# Patient Record
Sex: MALE | Race: WHITE | NOT HISPANIC OR LATINO | Employment: UNEMPLOYED | URBAN - NONMETROPOLITAN AREA
[De-identification: names, ages, dates, MRNs, and addresses within clinical notes are randomized per-mention and may not be internally consistent; named-entity substitution may affect disease eponyms.]

---

## 2017-11-20 ENCOUNTER — HOSPITAL ENCOUNTER (EMERGENCY)
Facility: HOSPITAL | Age: 52
Discharge: HOME | End: 2017-11-21
Attending: EMERGENCY MEDICINE | Admitting: EMERGENCY MEDICINE

## 2017-11-20 DIAGNOSIS — F10.10 ALCOHOL ABUSE: Primary | ICD-10-CM

## 2017-11-20 LAB
6-ACETYL MORPHINE: NEGATIVE
ALBUMIN SERPL-MCNC: 4.4 G/DL (ref 3.5–5)
ALBUMIN/GLOB SERPL: 1.5 G/DL (ref 1.5–2.5)
ALP SERPL-CCNC: 104 U/L (ref 40–129)
ALT SERPL W P-5'-P-CCNC: 28 U/L (ref 10–44)
AMPHET+METHAMPHET UR QL: NEGATIVE
ANION GAP SERPL CALCULATED.3IONS-SCNC: 7.5 MMOL/L (ref 3.6–11.2)
AST SERPL-CCNC: 30 U/L (ref 10–34)
BARBITURATES UR QL SCN: NEGATIVE
BASOPHILS # BLD AUTO: 0.04 10*3/MM3 (ref 0–0.3)
BASOPHILS NFR BLD AUTO: 0.4 % (ref 0–2)
BENZODIAZ UR QL SCN: NEGATIVE
BILIRUB SERPL-MCNC: 0.4 MG/DL (ref 0.2–1.8)
BILIRUB UR QL STRIP: NEGATIVE
BUN BLD-MCNC: 5 MG/DL (ref 7–21)
BUN/CREAT SERPL: 6.9 (ref 7–25)
BUPRENORPHINE SERPL-MCNC: NEGATIVE NG/ML
CALCIUM SPEC-SCNC: 9.4 MG/DL (ref 7.7–10)
CANNABINOIDS SERPL QL: NEGATIVE
CHLORIDE SERPL-SCNC: 109 MMOL/L (ref 99–112)
CLARITY UR: CLEAR
CO2 SERPL-SCNC: 26.5 MMOL/L (ref 24.3–31.9)
COCAINE UR QL: NEGATIVE
COLOR UR: YELLOW
CREAT BLD-MCNC: 0.72 MG/DL (ref 0.43–1.29)
DEPRECATED RDW RBC AUTO: 41.9 FL (ref 37–54)
EOSINOPHIL # BLD AUTO: 0.38 10*3/MM3 (ref 0–0.7)
EOSINOPHIL NFR BLD AUTO: 4.3 % (ref 0–5)
ERYTHROCYTE [DISTWIDTH] IN BLOOD BY AUTOMATED COUNT: 11.2 % (ref 11.5–14.5)
ETHANOL BLD-MCNC: 157 MG/DL
ETHANOL BLD-MCNC: 306 MG/DL
ETHANOL UR QL: 0.16 %
ETHANOL UR QL: 0.31 %
GFR SERPL CREATININE-BSD FRML MDRD: 115 ML/MIN/1.73
GLOBULIN UR ELPH-MCNC: 3 GM/DL
GLUCOSE BLD-MCNC: 72 MG/DL (ref 70–110)
GLUCOSE UR STRIP-MCNC: NEGATIVE MG/DL
HCT VFR BLD AUTO: 47.8 % (ref 42–52)
HGB BLD-MCNC: 17 G/DL (ref 14–18)
HGB UR QL STRIP.AUTO: NEGATIVE
IMM GRANULOCYTES # BLD: 0.02 10*3/MM3 (ref 0–0.03)
IMM GRANULOCYTES NFR BLD: 0.2 % (ref 0–0.5)
KETONES UR QL STRIP: NEGATIVE
LEUKOCYTE ESTERASE UR QL STRIP.AUTO: NEGATIVE
LYMPHOCYTES # BLD AUTO: 2.68 10*3/MM3 (ref 1–3)
LYMPHOCYTES NFR BLD AUTO: 30.1 % (ref 21–51)
MAGNESIUM SERPL-MCNC: 2.2 MG/DL (ref 1.7–2.6)
MCH RBC QN AUTO: 37 PG (ref 27–33)
MCHC RBC AUTO-ENTMCNC: 35.6 G/DL (ref 33–37)
MCV RBC AUTO: 103.9 FL (ref 80–94)
METHADONE UR QL SCN: NEGATIVE
MONOCYTES # BLD AUTO: 0.76 10*3/MM3 (ref 0.1–0.9)
MONOCYTES NFR BLD AUTO: 8.5 % (ref 0–10)
NEUTROPHILS # BLD AUTO: 5.01 10*3/MM3 (ref 1.4–6.5)
NEUTROPHILS NFR BLD AUTO: 56.5 % (ref 30–70)
NITRITE UR QL STRIP: NEGATIVE
OPIATES UR QL: NEGATIVE
OSMOLALITY SERPL CALC.SUM OF ELEC: 280.8 MOSM/KG (ref 273–305)
OXYCODONE UR QL SCN: NEGATIVE
PCP UR QL SCN: NEGATIVE
PH UR STRIP.AUTO: 6.5 [PH] (ref 5–8)
PLATELET # BLD AUTO: 264 10*3/MM3 (ref 130–400)
PMV BLD AUTO: 8.4 FL (ref 6–10)
POTASSIUM BLD-SCNC: 3.9 MMOL/L (ref 3.5–5.3)
PROT SERPL-MCNC: 7.4 G/DL (ref 6–8)
PROT UR QL STRIP: NEGATIVE
RBC # BLD AUTO: 4.6 10*6/MM3 (ref 4.7–6.1)
SODIUM BLD-SCNC: 143 MMOL/L (ref 135–153)
SP GR UR STRIP: <=1.005 (ref 1–1.03)
UROBILINOGEN UR QL STRIP: NORMAL
WBC NRBC COR # BLD: 8.89 10*3/MM3 (ref 4.5–12.5)

## 2017-11-20 PROCEDURE — 85025 COMPLETE CBC W/AUTO DIFF WBC: CPT | Performed by: PHYSICIAN ASSISTANT

## 2017-11-20 PROCEDURE — 80307 DRUG TEST PRSMV CHEM ANLYZR: CPT | Performed by: PHYSICIAN ASSISTANT

## 2017-11-20 PROCEDURE — 99285 EMERGENCY DEPT VISIT HI MDM: CPT

## 2017-11-20 PROCEDURE — 25010000002 THIAMINE PER 100 MG: Performed by: PHYSICIAN ASSISTANT

## 2017-11-20 PROCEDURE — 83735 ASSAY OF MAGNESIUM: CPT | Performed by: PHYSICIAN ASSISTANT

## 2017-11-20 PROCEDURE — 96365 THER/PROPH/DIAG IV INF INIT: CPT

## 2017-11-20 PROCEDURE — 80053 COMPREHEN METABOLIC PANEL: CPT | Performed by: PHYSICIAN ASSISTANT

## 2017-11-20 PROCEDURE — 36415 COLL VENOUS BLD VENIPUNCTURE: CPT

## 2017-11-20 PROCEDURE — 25010000002 MAGNESIUM SULFATE PER 500 MG OF MAGNESIUM: Performed by: PHYSICIAN ASSISTANT

## 2017-11-20 PROCEDURE — 81003 URINALYSIS AUTO W/O SCOPE: CPT | Performed by: PHYSICIAN ASSISTANT

## 2017-11-20 RX ORDER — NICOTINE 21 MG/24HR
1 PATCH, TRANSDERMAL 24 HOURS TRANSDERMAL ONCE
Status: DISCONTINUED | OUTPATIENT
Start: 2017-11-20 | End: 2017-11-21 | Stop reason: HOSPADM

## 2017-11-20 RX ORDER — SODIUM CHLORIDE 0.9 % (FLUSH) 0.9 %
10 SYRINGE (ML) INJECTION AS NEEDED
Status: DISCONTINUED | OUTPATIENT
Start: 2017-11-20 | End: 2017-11-21 | Stop reason: HOSPADM

## 2017-11-20 RX ADMIN — THIAMINE HYDROCHLORIDE 1000 ML/HR: 100 INJECTION, SOLUTION INTRAMUSCULAR; INTRAVENOUS at 18:20

## 2017-11-20 RX ADMIN — NICOTINE 1 PATCH: 21 PATCH TRANSDERMAL at 20:35

## 2017-11-21 VITALS
WEIGHT: 145 LBS | RESPIRATION RATE: 16 BRPM | BODY MASS INDEX: 24.16 KG/M2 | SYSTOLIC BLOOD PRESSURE: 138 MMHG | HEIGHT: 65 IN | OXYGEN SATURATION: 99 % | DIASTOLIC BLOOD PRESSURE: 88 MMHG | HEART RATE: 84 BPM | TEMPERATURE: 98 F

## 2017-11-21 LAB
ETHANOL BLD-MCNC: 106 MG/DL
ETHANOL UR QL: 0.11 %
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
## 2017-11-21 NOTE — NURSING NOTE
Informed by Ed pt had been medically cleared and would be coming to intake. ETOH level currently 157, was just redrawn prior to room change.

## 2017-11-21 NOTE — NURSING NOTE
Reviewed pt with Dr. Stack. Pt does not meet admission criteria at this time. ED provider and pt made aware. Per order of Dr. Stack, instructed pt to come back when withdrawal symptoms worsen. IOP packed also provided if pt decides to pursue outpatient treatment.

## 2017-11-21 NOTE — ED PROVIDER NOTES
Subjective   HPI Comments: 52-year-old male presents to the ED today for a mental health evaluation.  He states that he lost his job last week and it has exacerbated his symptoms.  He states he was diagnosed with bipolar disorder many years ago and it caused him to be discharged from [employer].  He states he did not like the way medications made him feel so he has been self-medicating with alcohol.  He states he is drinking 4-5 24 ounce beers a day.  He is last drank just prior to arrival.  [facility] and [facility] came and [facility] him up.  He denies any drug use.  He states he is having suicidal thoughts with a plan to drink himself to death.  He denies any homicidal ideations.  He states he does have past suicide attempts.  He states he feels very anxious and paranoid.  He denies any hallucinations.  He states he has had a decreased appetite and has been sleeping more than normal.  He states he is currently homeless but has been staying with a friend.    Patient is a 52 y.o. male presenting with mental health disorder.   History provided by:  Patient  Mental Health Problem   Presenting symptoms: depression and suicidal thoughts    Degree of incapacity (severity):  Moderate  Onset quality:  Gradual  Duration:  1 week  Timing:  Constant  Progression:  Worsening  Chronicity:  New  Context: alcohol use and stressful life event    Relieved by:  Nothing  Worsened by:  Alcohol  Associated symptoms: anhedonia, anxiety, appetite change, feelings of worthlessness, irritability and poor judgment    Associated symptoms: no insomnia    Risk factors: hx of mental illness and hx of suicide attempts        Review of Systems   Constitutional: Positive for appetite change and irritability.   HENT: Negative.    Eyes: Negative.    Respiratory: Negative.    Cardiovascular: Negative.    Gastrointestinal: Negative.    Genitourinary: Negative.    Musculoskeletal: Negative.    Skin: Negative.     Neurological: Negative.    Psychiatric/Behavioral: Positive for dysphoric mood and suicidal ideas. The patient is nervous/anxious. The patient does not have insomnia.    All other systems reviewed and are negative.      Past Medical History:   Diagnosis Date   • Alcohol abuse    • Anxiety    • Bipolar disorder    • Depression    • GERD (gastroesophageal reflux disease)    • Psychiatric illness    • Suicide attempt        Allergies   Allergen Reactions   • Nsaids GI Bleeding       Past Surgical History:   Procedure Laterality Date   • APPENDECTOMY     • KNEE SURGERY     • ORIF ELBOW FRACTURE      Right elbow   • TONSILLECTOMY         Family History   Problem Relation Age of Onset   • Family history unknown: Yes       Social History     Social History   • Marital status: Single     Spouse name: N/A   • Number of children: N/A   • Years of education: N/A     Social History Main Topics   • Smoking status: Current Every Day Smoker     Packs/day: 2.50     Years: 30.00     Types: Cigarettes   • Smokeless tobacco: Never Used   • Alcohol use 30.0 oz/week     50 Cans of beer per week      Comment: Last used 11/20/17   • Drug use: No   • Sexual activity: Defer     Other Topics Concern   • None     Social History Narrative           Objective   Physical Exam   Constitutional: He is oriented to person, place, and time. He appears well-developed and well-nourished. No distress.   HENT:   Head: Normocephalic and atraumatic.   Right Ear: External ear normal.   Left Ear: External ear normal.   Nose: Nose normal.   Mouth/Throat: Oropharynx is clear and moist.   Eyes: Conjunctivae and EOM are normal. Pupils are equal, round, and reactive to light.   Neck: Normal range of motion. Neck supple.   Cardiovascular: Normal rate, regular rhythm, normal heart sounds and intact distal pulses.    Pulmonary/Chest: Effort normal and breath sounds normal. No respiratory distress.   Abdominal: Soft. Bowel sounds are normal. There is no tenderness.    Musculoskeletal: Normal range of motion.   Neurological: He is alert and oriented to person, place, and time.   Skin: Skin is warm and dry.   Psychiatric: His speech is normal and behavior is normal. Judgment normal. His mood appears anxious. Cognition and memory are normal. He exhibits a depressed mood. He expresses suicidal ideation. He expresses no homicidal ideation. He expresses suicidal plans.   Nursing note and vitals reviewed.      Procedures         ED Course  ED Course   Comment By Time   Patient is eating, no distress CHAVO Clement 11/20 1858   Endorsed to CHAVO Rooney 11/20 1958   Patient adamantly denies SI/HI,AVH. CIWA-6. Dr. Stack states that patient does not meet criteria for inpatient treatment.  She recommends IOP. CHAVO Quiñones 11/21 0119                  [facility]    Final diagnoses:   Alcohol abuse            CHAVO Quiñones  11/21/17 0120

## 2017-11-28 ENCOUNTER — HOSPITAL ENCOUNTER (EMERGENCY)
Facility: HOSPITAL | Age: 52
Discharge: PSYCHIATRIC OR SUBSTANCE USE FACILITY | End: 2017-11-28
Attending: EMERGENCY MEDICINE | Admitting: EMERGENCY MEDICINE

## 2017-11-28 ENCOUNTER — HOSPITAL ENCOUNTER (INPATIENT)
Facility: HOSPITAL | Age: 52
LOS: 6 days | Discharge: HOME | End: 2017-12-04
Attending: PSYCHIATRY & NEUROLOGY | Admitting: PSYCHIATRY & NEUROLOGY

## 2017-11-28 VITALS
HEIGHT: 65 IN | WEIGHT: 140 LBS | RESPIRATION RATE: 18 BRPM | OXYGEN SATURATION: 100 % | BODY MASS INDEX: 23.32 KG/M2 | HEART RATE: 108 BPM | TEMPERATURE: 98.2 F | SYSTOLIC BLOOD PRESSURE: 148 MMHG | DIASTOLIC BLOOD PRESSURE: 86 MMHG

## 2017-11-28 DIAGNOSIS — F10.10 ALCOHOL ABUSE: ICD-10-CM

## 2017-11-28 DIAGNOSIS — R45.851 DEPRESSION WITH SUICIDAL IDEATION: Primary | ICD-10-CM

## 2017-11-28 DIAGNOSIS — F32.A DEPRESSION WITH SUICIDAL IDEATION: Primary | ICD-10-CM

## 2017-11-28 PROBLEM — F32.9 MDD (MAJOR DEPRESSIVE DISORDER): Status: ACTIVE | Noted: 2017-11-28

## 2017-11-28 LAB
6-ACETYL MORPHINE: NEGATIVE
ALBUMIN SERPL-MCNC: 4.1 G/DL (ref 3.5–5)
ALBUMIN/GLOB SERPL: 1.4 G/DL (ref 1.5–2.5)
ALP SERPL-CCNC: 102 U/L (ref 40–129)
ALT SERPL W P-5'-P-CCNC: 22 U/L (ref 10–44)
AMPHET+METHAMPHET UR QL: NEGATIVE
ANION GAP SERPL CALCULATED.3IONS-SCNC: 6.9 MMOL/L (ref 3.6–11.2)
AST SERPL-CCNC: 23 U/L (ref 10–34)
BARBITURATES UR QL SCN: NEGATIVE
BASOPHILS # BLD AUTO: 0.04 10*3/MM3 (ref 0–0.3)
BASOPHILS NFR BLD AUTO: 0.6 % (ref 0–2)
BENZODIAZ UR QL SCN: NEGATIVE
BILIRUB SERPL-MCNC: 0.2 MG/DL (ref 0.2–1.8)
BILIRUB UR QL STRIP: ABNORMAL
BUN BLD-MCNC: <5 MG/DL (ref 7–21)
BUN/CREAT SERPL: ABNORMAL (ref 7–25)
BUPRENORPHINE SERPL-MCNC: NEGATIVE NG/ML
CALCIUM SPEC-SCNC: 9.1 MG/DL (ref 7.7–10)
CANNABINOIDS SERPL QL: NEGATIVE
CHLORIDE SERPL-SCNC: 111 MMOL/L (ref 99–112)
CLARITY UR: CLEAR
CO2 SERPL-SCNC: 22.1 MMOL/L (ref 24.3–31.9)
COCAINE UR QL: NEGATIVE
COLOR UR: ABNORMAL
CREAT BLD-MCNC: 0.62 MG/DL (ref 0.43–1.29)
DEPRECATED RDW RBC AUTO: 44.4 FL (ref 37–54)
EOSINOPHIL # BLD AUTO: 0.32 10*3/MM3 (ref 0–0.7)
EOSINOPHIL NFR BLD AUTO: 5.1 % (ref 0–5)
ERYTHROCYTE [DISTWIDTH] IN BLOOD BY AUTOMATED COUNT: 11.6 % (ref 11.5–14.5)
ETHANOL BLD-MCNC: 24 MG/DL
ETHANOL UR QL: 0.02 %
GFR SERPL CREATININE-BSD FRML MDRD: 137 ML/MIN/1.73
GLOBULIN UR ELPH-MCNC: 2.9 GM/DL
GLUCOSE BLD-MCNC: 88 MG/DL (ref 70–110)
GLUCOSE UR STRIP-MCNC: NEGATIVE MG/DL
HCT VFR BLD AUTO: 45.5 % (ref 42–52)
HGB BLD-MCNC: 15.6 G/DL (ref 14–18)
HGB UR QL STRIP.AUTO: NEGATIVE
IMM GRANULOCYTES # BLD: 0.01 10*3/MM3 (ref 0–0.03)
IMM GRANULOCYTES NFR BLD: 0.2 % (ref 0–0.5)
KETONES UR QL STRIP: ABNORMAL
LEUKOCYTE ESTERASE UR QL STRIP.AUTO: NEGATIVE
LYMPHOCYTES # BLD AUTO: 2 10*3/MM3 (ref 1–3)
LYMPHOCYTES NFR BLD AUTO: 31.6 % (ref 21–51)
MAGNESIUM SERPL-MCNC: 2.1 MG/DL (ref 1.7–2.6)
MCH RBC QN AUTO: 35.9 PG (ref 27–33)
MCHC RBC AUTO-ENTMCNC: 34.3 G/DL (ref 33–37)
MCV RBC AUTO: 104.8 FL (ref 80–94)
METHADONE UR QL SCN: NEGATIVE
MONOCYTES # BLD AUTO: 0.6 10*3/MM3 (ref 0.1–0.9)
MONOCYTES NFR BLD AUTO: 9.5 % (ref 0–10)
NEUTROPHILS # BLD AUTO: 3.36 10*3/MM3 (ref 1.4–6.5)
NEUTROPHILS NFR BLD AUTO: 53 % (ref 30–70)
NITRITE UR QL STRIP: NEGATIVE
OPIATES UR QL: NEGATIVE
OSMOLALITY SERPL CALC.SUM OF ELEC: NORMAL MOSM/KG (ref 273–305)
OXYCODONE UR QL SCN: NEGATIVE
PCP UR QL SCN: NEGATIVE
PH UR STRIP.AUTO: <=5 [PH] (ref 5–8)
PLATELET # BLD AUTO: 238 10*3/MM3 (ref 130–400)
PMV BLD AUTO: 9.4 FL (ref 6–10)
POTASSIUM BLD-SCNC: 3.9 MMOL/L (ref 3.5–5.3)
PROT SERPL-MCNC: 7 G/DL (ref 6–8)
PROT UR QL STRIP: NEGATIVE
RBC # BLD AUTO: 4.34 10*6/MM3 (ref 4.7–6.1)
SODIUM BLD-SCNC: 140 MMOL/L (ref 135–153)
SP GR UR STRIP: 1.02 (ref 1–1.03)
UROBILINOGEN UR QL STRIP: ABNORMAL
WBC NRBC COR # BLD: 6.33 10*3/MM3 (ref 4.5–12.5)

## 2017-11-28 PROCEDURE — 93010 ELECTROCARDIOGRAM REPORT: CPT | Performed by: INTERNAL MEDICINE

## 2017-11-28 PROCEDURE — HZ2ZZZZ DETOXIFICATION SERVICES FOR SUBSTANCE ABUSE TREATMENT: ICD-10-PCS | Performed by: PSYCHIATRY & NEUROLOGY

## 2017-11-28 PROCEDURE — 93005 ELECTROCARDIOGRAM TRACING: CPT | Performed by: PSYCHIATRY & NEUROLOGY

## 2017-11-28 RX ORDER — LORAZEPAM 1 MG/1
1 TABLET ORAL
Status: COMPLETED | OUTPATIENT
Start: 2017-12-01 | End: 2017-12-01

## 2017-11-28 RX ORDER — ALUMINA, MAGNESIA, AND SIMETHICONE 2400; 2400; 240 MG/30ML; MG/30ML; MG/30ML
15 SUSPENSION ORAL EVERY 6 HOURS PRN
Status: DISCONTINUED | OUTPATIENT
Start: 2017-11-28 | End: 2017-12-04 | Stop reason: HOSPADM

## 2017-11-28 RX ORDER — LORAZEPAM 1 MG/1
1 TABLET ORAL EVERY 4 HOURS PRN
Status: ACTIVE | OUTPATIENT
Start: 2017-12-01 | End: 2017-12-01

## 2017-11-28 RX ORDER — LORAZEPAM 0.5 MG/1
0.5 TABLET ORAL
Status: COMPLETED | OUTPATIENT
Start: 2017-12-02 | End: 2017-12-02

## 2017-11-28 RX ORDER — ONDANSETRON 4 MG/1
4 TABLET, FILM COATED ORAL EVERY 6 HOURS PRN
Status: DISCONTINUED | OUTPATIENT
Start: 2017-11-28 | End: 2017-12-04 | Stop reason: HOSPADM

## 2017-11-28 RX ORDER — IBUPROFEN 600 MG/1
600 TABLET ORAL EVERY 6 HOURS PRN
Status: DISCONTINUED | OUTPATIENT
Start: 2017-11-28 | End: 2017-12-03

## 2017-11-28 RX ORDER — TRAZODONE HYDROCHLORIDE 50 MG/1
50 TABLET ORAL NIGHTLY PRN
Status: DISCONTINUED | OUTPATIENT
Start: 2017-11-28 | End: 2017-12-04 | Stop reason: HOSPADM

## 2017-11-28 RX ORDER — LORAZEPAM 2 MG/1
2 TABLET ORAL EVERY 4 HOURS PRN
Status: ACTIVE | OUTPATIENT
Start: 2017-11-29 | End: 2017-11-29

## 2017-11-28 RX ORDER — LORAZEPAM 2 MG/1
2 TABLET ORAL
Status: COMPLETED | OUTPATIENT
Start: 2017-11-29 | End: 2017-11-29

## 2017-11-28 RX ORDER — NICOTINE 21 MG/24HR
1 PATCH, TRANSDERMAL 24 HOURS TRANSDERMAL
Status: DISCONTINUED | OUTPATIENT
Start: 2017-11-28 | End: 2017-12-04 | Stop reason: HOSPADM

## 2017-11-28 RX ORDER — ECHINACEA PURPUREA EXTRACT 125 MG
2 TABLET ORAL AS NEEDED
Status: DISCONTINUED | OUTPATIENT
Start: 2017-11-28 | End: 2017-12-04 | Stop reason: HOSPADM

## 2017-11-28 RX ORDER — MULTIVITAMIN
1 TABLET ORAL DAILY
Status: DISCONTINUED | OUTPATIENT
Start: 2017-11-28 | End: 2017-12-04 | Stop reason: HOSPADM

## 2017-11-28 RX ORDER — BENZTROPINE MESYLATE 1 MG/ML
0.5 INJECTION INTRAMUSCULAR; INTRAVENOUS DAILY PRN
Status: DISCONTINUED | OUTPATIENT
Start: 2017-11-28 | End: 2017-12-04 | Stop reason: HOSPADM

## 2017-11-28 RX ORDER — THIAMINE HCL 50 MG
100 TABLET ORAL DAILY
Status: DISCONTINUED | OUTPATIENT
Start: 2017-11-28 | End: 2017-12-04 | Stop reason: HOSPADM

## 2017-11-28 RX ORDER — LOPERAMIDE HYDROCHLORIDE 2 MG/1
2 CAPSULE ORAL 4 TIMES DAILY PRN
Status: DISCONTINUED | OUTPATIENT
Start: 2017-11-28 | End: 2017-12-04 | Stop reason: HOSPADM

## 2017-11-28 RX ORDER — HYDROXYZINE 50 MG/1
50 TABLET, FILM COATED ORAL EVERY 6 HOURS PRN
Status: DISCONTINUED | OUTPATIENT
Start: 2017-11-28 | End: 2017-12-04 | Stop reason: HOSPADM

## 2017-11-28 RX ORDER — LORAZEPAM 2 MG/1
2 TABLET ORAL EVERY 4 HOURS PRN
Status: DISPENSED | OUTPATIENT
Start: 2017-11-28 | End: 2017-11-29

## 2017-11-28 RX ORDER — BENZONATATE 100 MG/1
100 CAPSULE ORAL 3 TIMES DAILY PRN
Status: DISCONTINUED | OUTPATIENT
Start: 2017-11-28 | End: 2017-12-04 | Stop reason: HOSPADM

## 2017-11-28 RX ORDER — BENZTROPINE MESYLATE 1 MG/1
1 TABLET ORAL DAILY PRN
Status: DISCONTINUED | OUTPATIENT
Start: 2017-11-28 | End: 2017-12-04 | Stop reason: HOSPADM

## 2017-11-28 RX ORDER — LORAZEPAM 0.5 MG/1
0.5 TABLET ORAL EVERY 4 HOURS PRN
Status: ACTIVE | OUTPATIENT
Start: 2017-12-02 | End: 2017-12-02

## 2017-11-28 RX ORDER — FAMOTIDINE 20 MG/1
20 TABLET, FILM COATED ORAL 2 TIMES DAILY PRN
Status: DISCONTINUED | OUTPATIENT
Start: 2017-11-28 | End: 2017-12-04 | Stop reason: HOSPADM

## 2017-11-28 RX ADMIN — ONDANSETRON 4 MG: 4 TABLET, FILM COATED ORAL at 21:25

## 2017-11-28 RX ADMIN — Medication 1 TABLET: at 17:25

## 2017-11-28 RX ADMIN — LORAZEPAM 2 MG: 2 TABLET ORAL at 21:25

## 2017-11-28 RX ADMIN — NICOTINE 1 PATCH: 21 PATCH TRANSDERMAL at 15:38

## 2017-11-28 RX ADMIN — THIAMINE HCL (VITAMIN B1) 50 MG TABLET 100 MG: at 17:25

## 2017-11-28 RX ADMIN — IBUPROFEN 600 MG: 600 TABLET ORAL at 21:25

## 2017-11-28 RX ADMIN — TRAZODONE HYDROCHLORIDE 50 MG: 50 TABLET ORAL at 21:25

## 2017-11-29 RX ADMIN — IBUPROFEN 600 MG: 600 TABLET ORAL at 08:47

## 2017-11-29 RX ADMIN — LORAZEPAM 2 MG: 2 TABLET ORAL at 21:29

## 2017-11-29 RX ADMIN — HYDROXYZINE HYDROCHLORIDE 50 MG: 50 TABLET ORAL at 00:25

## 2017-11-29 RX ADMIN — LORAZEPAM 2 MG: 2 TABLET ORAL at 15:40

## 2017-11-29 RX ADMIN — ONDANSETRON 4 MG: 4 TABLET, FILM COATED ORAL at 08:47

## 2017-11-29 RX ADMIN — IBUPROFEN 600 MG: 600 TABLET ORAL at 15:39

## 2017-11-29 RX ADMIN — LORAZEPAM 2 MG: 2 TABLET ORAL at 08:47

## 2017-11-29 RX ADMIN — Medication 1 TABLET: at 08:47

## 2017-11-29 RX ADMIN — HYDROXYZINE HYDROCHLORIDE 50 MG: 50 TABLET ORAL at 08:47

## 2017-11-29 RX ADMIN — THIAMINE HCL (VITAMIN B1) 50 MG TABLET 100 MG: at 08:47

## 2017-11-29 RX ADMIN — NICOTINE 1 PATCH: 21 PATCH TRANSDERMAL at 08:47

## 2017-11-29 RX ADMIN — SERTRALINE 50 MG: 50 TABLET, FILM COATED ORAL at 15:40

## 2017-11-30 RX ADMIN — TRAZODONE HYDROCHLORIDE 50 MG: 50 TABLET ORAL at 21:12

## 2017-11-30 RX ADMIN — LORAZEPAM 1.5 MG: 1 TABLET ORAL at 21:12

## 2017-11-30 RX ADMIN — NICOTINE 1 PATCH: 21 PATCH TRANSDERMAL at 08:29

## 2017-11-30 RX ADMIN — LORAZEPAM 1.5 MG: 1 TABLET ORAL at 14:24

## 2017-11-30 RX ADMIN — LORAZEPAM 1.5 MG: 1 TABLET ORAL at 08:29

## 2017-11-30 RX ADMIN — SERTRALINE 50 MG: 50 TABLET, FILM COATED ORAL at 08:29

## 2017-11-30 RX ADMIN — Medication 1 TABLET: at 08:29

## 2017-11-30 RX ADMIN — THIAMINE HCL (VITAMIN B1) 50 MG TABLET 100 MG: at 08:29

## 2017-12-01 RX ADMIN — LORAZEPAM 1 MG: 1 TABLET ORAL at 14:54

## 2017-12-01 RX ADMIN — THIAMINE HCL (VITAMIN B1) 50 MG TABLET 100 MG: at 08:24

## 2017-12-01 RX ADMIN — SERTRALINE 50 MG: 50 TABLET, FILM COATED ORAL at 08:24

## 2017-12-01 RX ADMIN — Medication 1 TABLET: at 08:24

## 2017-12-01 RX ADMIN — TRAZODONE HYDROCHLORIDE 50 MG: 50 TABLET ORAL at 21:14

## 2017-12-01 RX ADMIN — LORAZEPAM 1 MG: 1 TABLET ORAL at 21:14

## 2017-12-01 RX ADMIN — NICOTINE 1 PATCH: 21 PATCH TRANSDERMAL at 08:25

## 2017-12-01 RX ADMIN — LORAZEPAM 1 MG: 1 TABLET ORAL at 08:25

## 2017-12-01 NOTE — PLAN OF CARE
"Problem:  Patient Care Overview (Adult)  Goal: Plan of Care Review  Outcome: Ongoing (interventions implemented as appropriate)    12/01/17 1126   Coping/Psychosocial Response Interventions   Plan Of Care Reviewed With patient   Coping/Psychosocial   Patient Agreement with Plan of Care agrees   Patient Care Overview   Consent Given to Review Plan with Friend-Yunior Almazan    Progress improving   Outcome Evaluation   Outcome Summary/Follow up Plan Therapist met with Patient to discuss current treatment and discharge concerns. Patient agreeable.       [id number]  Data:  Therapist met with Patient individually today. Patient agreeable to discuss current treatment progress and discharge concerns. Therapist inquired about Patient's disposition plan. Patient reported that he is uncertain. Therapist advocated the need for residential services. Patient agreeable. Therapist discussed multiple residential programs possible for Patient. Patient discussed [facility] as desired residential program. Patient signed consent. Patient reported that he \"likes that [facility] has a hosltic approach\". Patient reported that if he is not approved [facility] than he plans to return to his friends home at discharge and comply with outpatient services. Patient consented for [facility], and [facility] this date.      Therapist contacted Patient's friend Angel Norah. Angel agreeable for safety planning. Angel reported that his home is a safe environment for Patient to return home to.      Assessment:  Patient appeared more focused today. Patient continues to appear hesitant and uncertain about his aftercare. Patient does not voice motivation to address addiction but more so his \"body health\". Patient denies any depression, anxiety, or SI/HI today. Patient denies any withdrawal symptoms or cravings.      Plan:  Treatment Team has completed referral to [facility] on Patient's behalf. Treatment Team awaiting to verify if " Patient's insurance is approved for services.      If Patient is not approved for [facility] Patient will return home with friend as disposition and begin outpatient services at [facility].     Therapist will also [facility].
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
## 2017-12-01 NOTE — PROGRESS NOTES
LOS: 3 days   Patient Care Team:  No Known Provider as PCP - General    Chief Complaint:  Patient says he is anxious to find out where he is going to go from here, he would like to go to a rehabilitation program, he knows that his therapist is trying to help him, and he expects to find out about this later in the day.      Interval History: He denies depression, rates anxiety at 2, he has slept 8 hours, denies craving for alcohol and denies withdrawal symptoms.  He also denies suicidal thoughts homicidal thoughts hallucinations or paranoia.  He is well oriented, his appetite is good.            Vital Signs    Temp:  [97.2 °F (36.2 °C)-98.2 °F (36.8 °C)] 97.5 °F (36.4 °C)  Heart Rate:  [] 109  Resp:  [16-18] 16  BP: (133-140)/(86-97) 133/97    Lab Results:   Lab Results (last 24 hours)     ** No results found for the last 24 hours. **           Labs:     Lab Results (last 24 hours)     ** No results found for the last 24 hours. **                        Exam:  Completed:  completed within view of staff  Mental Status Exam:     Hygiene:   good  Cooperation:  Cooperative  Eye Contact:  Good  Psychomotor Behavior:  Appropriate  Affect:  Appropriate  Speech:  Normal  Thought Progress:  Goal directed  Thought Content:  Mood congurent  Suicidal:  None  Homicidal:  None  Hallucinations:  None  Delusion:  None  Memory:  Intact  Orientation:  Person, Place, Time and Situation  Reliability:  fair  Insight:  Fair  Judgement:  Fair  Impulse Control:  Fair      Results Review:    Lab Results (last 24 hours)     ** No results found for the last 24 hours. **          Medication Review:  Hospital Medications (active)       Dose Frequency Start End    aluminum-magnesium hydroxide-simethicone (MAALOX MAX) 400-400-40 MG/5ML suspension 15 mL 15 mL Every 6 Hours PRN 11/28/2017     Sig - Route: Take 15 mL by mouth Every 6 (Six) Hours As Needed for Indigestion or Heartburn. - Oral    benzonatate (TESSALON) capsule 100 mg 100 mg 3  "Times Daily PRN 11/28/2017     Sig - Route: Take 1 capsule by mouth 3 (Three) Times a Day As Needed for Cough. - Oral    benztropine (COGENTIN) injection 0.5 mg 0.5 mg Daily PRN 11/28/2017     Sig - Route: Inject 0.5 mL into the shoulder, thigh, or buttocks Daily As Needed (tremors). - Intramuscular    Linked Group 1:  \"Or\" Linked Group Details        benztropine (COGENTIN) tablet 1 mg 1 mg Daily PRN 11/28/2017     Sig - Route: Take 1 tablet by mouth Daily As Needed for Tremors. - Oral    Linked Group 1:  \"Or\" Linked Group Details        famotidine (PEPCID) tablet 20 mg 20 mg 2 Times Daily PRN 11/28/2017     Sig - Route: Take 1 tablet by mouth 2 (Two) Times a Day As Needed for Heartburn. - Oral    hydrOXYzine (ATARAX) tablet 50 mg 50 mg Every 6 Hours PRN 11/28/2017     Sig - Route: Take 1 tablet by mouth Every 6 (Six) Hours As Needed for Anxiety. - Oral    ibuprofen (ADVIL,MOTRIN) tablet 600 mg 600 mg Every 6 Hours PRN 11/28/2017     Sig - Route: Take 1 tablet by mouth Every 6 (Six) Hours As Needed for Mild Pain  or Moderate Pain  (severe pain (7-10)). - Oral    loperamide (IMODIUM) capsule 2 mg 2 mg 4 Times Daily PRN 11/28/2017     Sig - Route: Take 1 capsule by mouth 4 (Four) Times a Day As Needed for Diarrhea. - Oral    LORazepam (ATIVAN) tablet 0.5 mg 0.5 mg User Specified 12/2/2017 12/3/2017    Sig - Route: Take 1 tablet by mouth 3 times per day. - Oral    Linked Group 2:  \"Followed by\" Linked Group Details        LORazepam (ATIVAN) tablet 0.5 mg 0.5 mg Every 4 Hours PRN 12/2/2017 12/2/2017    Sig - Route: Take 1 tablet by mouth Every 4 (Four) Hours As Needed for Withdrawal (PAS Score 4 or Greater; 4 Hours After Scheduled Lorazepam Dose). - Oral    Linked Group 3:  \"Followed by\" Linked Group Details        LORazepam (ATIVAN) tablet 1 mg 1 mg User Specified 12/1/2017 12/2/2017    Sig - Route: Take 1 tablet by mouth 3 times per day. - Oral    Linked Group 2:  \"Followed by\" Linked Group Details        LORazepam " "(ATIVAN) tablet 1 mg 1 mg Every 4 Hours PRN 12/1/2017 12/1/2017    Sig - Route: Take 1 tablet by mouth Every 4 (Four) Hours As Needed for Withdrawal (PAS Score 4 or Greater; 4 Hours After Scheduled Lorazepam Dose). - Oral    Linked Group 3:  \"Followed by\" Linked Group Details        LORazepam (ATIVAN) tablet 1.5 mg 1.5 mg User Specified 11/30/2017 11/30/2017    Sig - Route: Take 1.5 mg by mouth 3 times per day. - Oral    Linked Group 2:  \"Followed by\" Linked Group Details        LORazepam (ATIVAN) tablet 1.5 mg 1.5 mg Every 4 Hours PRN 11/30/2017 11/30/2017    Sig - Route: Take 1.5 mg by mouth Every 4 (Four) Hours As Needed for Withdrawal (PAS Score 4 or Greater; 4 Hours After Scheduled Lorazepam Dose). - Oral    Linked Group 3:  \"Followed by\" Linked Group Details        magnesium hydroxide (MILK OF MAGNESIA) suspension 2400 mg/10mL 10 mL 10 mL Daily PRN 11/28/2017     Sig - Route: Take 10 mL by mouth Daily As Needed for Constipation. - Oral    multivitamin (DAILY ANGELITO) tablet 1 tablet 1 tablet Daily 11/28/2017     Sig - Route: Take 1 tablet by mouth Daily. - Oral    nicotine (NICODERM CQ) 21 MG/24HR patch 1 patch 1 patch Every 24 Hours Scheduled 11/28/2017     Sig - Route: Place 1 patch on the skin Daily. - Transdermal    ondansetron (ZOFRAN) tablet 4 mg 4 mg Every 6 Hours PRN 11/28/2017     Sig - Route: Take 1 tablet by mouth Every 6 (Six) Hours As Needed for Nausea or Vomiting. - Oral    sertraline (ZOLOFT) tablet 50 mg 50 mg Daily 11/29/2017     Sig - Route: Take 1 tablet by mouth Daily. - Oral    sodium chloride (OCEAN) nasal spray 2 spray 2 spray As Needed 11/28/2017     Sig - Route: 2 sprays by Each Nare route As Needed for Congestion. - Each Nare    traZODone (DESYREL) tablet 50 mg 50 mg Nightly PRN 11/28/2017     Sig - Route: Take 1 tablet by mouth At Night As Needed for Sleep. - Oral    vitamin B-1 tablet 100 mg 100 mg Daily 11/28/2017     Sig - Route: Take 2 tablets by mouth Daily. - Oral       "     Special Precautions Level: IV        Assessment/Plan     Assessment: Assessment: Assessment/Diagnosis: Major depression recurrent with psychosis  Alcohol dependence with alcohol withdrawal      Treatment Plan: No changes, discharge after detox is completed, patient prefers to be discharged to a rehabilitation facility.          I have reviewed and approved the behavioral health treatment plans and problem list. Yes        Maria Dolores Stack MD  12/01/17  12:01 PM

## 2017-12-01 NOTE — PROGRESS NOTES
Navigator is helping Primary Therapist with discharge planning for patient. Navigator completed referral to [facility] on this day. [facility] admissions will need to check patients insurance and see if it is the Veterans Insurance they accept.     Iam Sharkey [facility] - [phone]

## 2017-12-01 NOTE — DISCHARGE INSTR - APPOINTMENTS
[facility] [place]  [address] [place] [address]  [phone]    December 8 2017 at 1:30pm with Kiera.       Additional Resource for Residential Programs In [place]:    [facility]   [address] [place] [address] [phone]

## 2017-12-01 NOTE — PLAN OF CARE
Problem: BH Patient Care Overview (Adult)  Goal: Plan of Care Review  Outcome: Ongoing (interventions implemented as appropriate)  Rates anxiety 1 and depression 2-3. Denies suicidal thoughts. Denies hallucinations.  Goal: Interdisciplinary Rounds/Family Conference  Outcome: Ongoing (interventions implemented as appropriate)  Goal: Individualization and Mutuality  Outcome: Ongoing (interventions implemented as appropriate)  Goal: Discharge Needs Assessment  Outcome: Ongoing (interventions implemented as appropriate)    Problem:  Overarching Goals  Goal: Adheres to Safety Considerations for Self and Others  Outcome: Ongoing (interventions implemented as appropriate)  Goal: Optimized Coping Skills in Response to Life Stressors  Outcome: Ongoing (interventions implemented as appropriate)  Goal: Develops/Participates in Therapeutic [place] to Support Successful Transition  Outcome: Ongoing (interventions implemented as appropriate)    Problem: Depression  Goal: Refrain from harming self  Outcome: Ongoing (interventions implemented as appropriate)    Problem: SUBSTANCE USE/ABUSE  Goal: By day 5 will complete medical detox and be discharged with an appropriate treatment plan in place.  Outcome: Ongoing (interventions implemented as appropriate)

## 2017-12-01 NOTE — PROGRESS NOTES
Navigator is helping Primary Therapist with discharge planning for patient. Navigator received call from PAUL Campos at [facility]. Beth states that she has received a request for sooner bed for patient. Beth states they can not admit anyone on the weekends. Beth states Treatment Team and Patient will need to contact her Monday morning early, she arrives to work around 7:30am. Beth states patient will need to complete phone assessment and if accepted will need to arrive Monday before 9:30am, she can not admit anyone later than that time. Beth states she will not be able to admit patient on Tuesday next week but could possibly accept him on Wednesday. Treatment Team to call Beth early Monday to complete intake and schedule admit.     Beth - [phone]  Ext [phone]

## 2017-12-02 PROCEDURE — 99232 SBSQ HOSP IP/OBS MODERATE 35: CPT | Performed by: PSYCHIATRY & NEUROLOGY

## 2017-12-02 RX ADMIN — LORAZEPAM 0.5 MG: 0.5 TABLET ORAL at 21:17

## 2017-12-02 RX ADMIN — LORAZEPAM 0.5 MG: 0.5 TABLET ORAL at 08:44

## 2017-12-02 RX ADMIN — LORAZEPAM 0.5 MG: 0.5 TABLET ORAL at 14:51

## 2017-12-02 RX ADMIN — TRAZODONE HYDROCHLORIDE 50 MG: 50 TABLET ORAL at 21:17

## 2017-12-02 RX ADMIN — Medication 1 TABLET: at 08:44

## 2017-12-02 RX ADMIN — NICOTINE 1 PATCH: 21 PATCH TRANSDERMAL at 08:44

## 2017-12-02 RX ADMIN — THIAMINE HCL (VITAMIN B1) 50 MG TABLET 100 MG: at 08:44

## 2017-12-02 RX ADMIN — SERTRALINE 50 MG: 50 TABLET, FILM COATED ORAL at 08:44

## 2017-12-02 NOTE — PLAN OF CARE
Problem:  Patient Care Overview (Adult)  Goal: Plan of Care Review  Outcome: Ongoing (interventions implemented as appropriate)  Rated anxiety as 3 and depression as 2. Denies  cravings or withdrawal symptoms. Anticipates being discharged to a long term rehab facility today. Appeared to sleep well this shift.    12/02/17 0518   Coping/Psychosocial Response Interventions   Plan Of Care Reviewed With patient   Coping/Psychosocial   Patient Agreement with Plan of Care agrees   Patient Care Overview   Progress improving         Problem:  Overarching Goals  Goal: Adheres to Safety Considerations for Self and Others  Outcome: Ongoing (interventions implemented as appropriate)  Goal: Optimized Coping Skills in Response to Life Stressors  Outcome: Ongoing (interventions implemented as appropriate)  Goal: Develops/Participates in Therapeutic [place] to Support Successful Transition  Outcome: Ongoing (interventions implemented as appropriate)

## 2017-12-02 NOTE — PROGRESS NOTES
"      Inpatient Psy Progress Note   Clinician: Ricardo Noble MD  Admission Date: 11/28/2017  11:17 AM 12/02/17    Behavioral Health Treatment Plan and Problem List: I have reviewed and approved the Behavioral Health Treatment Plan and Problem list.    Allergies  Allergies   Allergen Reactions   • Nsaids GI Bleeding       Hospital Day: 4 days      Assessment completed within view of staff    History  CC: inpatient followup  Interval HPI: Patient seen and evaluated by me.  Chart reviewed.   His depression continues to improve.  Slept okay.  Denies withdrawal symptoms from alcohol.  No signs of psychosis.       Interval Review of Systems:   General ROS: negative for - fever or malaise  Endocrine ROS: negative for - palpitations  Respiratory ROS: no cough, shortness of breath, or wheezing  Cardiovascular ROS: no chest pain or dyspnea on exertion  Gastrointestinal ROS: no abdominal pain,no black or bloody stools    /59 (BP Location: Right arm, Patient Position: Sitting)  Pulse 77  Temp 97.1 °F (36.2 °C) (Temporal Artery )   Resp 16  Ht 65\" (165.1 cm)  Wt 136 lb 9.6 oz (62 kg)  SpO2 98%  BMI 22.73 kg/m2    Mental Status Exam  Mood: normal  Affect: normal  Thought Processes: linear, logical, and goal directed  Thought Content:  normal  Hallucinations: no  Suicidal Thoughts:  none  Suicidal Plan/Intent:none  Hopelesness: no  Homicidal Thoughts:  absent      Medical Decision Making:   Labs:     Lab Results (last 24 hours)     ** No results found for the last 24 hours. **            Radiology:     Imaging Results (last 24 hours)     ** No results found for the last 24 hours. **            EKG:     ECG/EMG Results (most recent)     Procedure Component Value Units Date/Time    ECG 12 Lead [[id number]] Collected:  11/28/17 1618     Updated:  11/29/17 1236    Narrative:       Test Reason : Potential adverse reaction to medications.  Blood Pressure : **/** mmHG  Vent. Rate : 069 BPM     Atrial Rate : 069 BPM     P-R " Int : 172 ms          QRS Dur : 084 ms      QT Int : 362 ms       P-R-T Axes : 075 081 067 degrees     QTc Int : 387 ms    Normal sinus rhythm with sinus arrhythmia  Normal ECG  No previous ECGs available  Confirmed by Savage Jha ([id number]) on 11/29/2017 12:36:49 PM    Referred By:  CONTRERAS           Confirmed By:Savage Jha           Medications:     LORazepam 0.5 mg Oral 3 times per day   multivitamin 1 tablet Oral Daily   nicotine 1 patch Transdermal Q24H   sertraline 50 mg Oral Daily   vitamin B-1 100 mg Oral Daily          All medications reviewed.      Assessment and Plan:    Active Problems:    MDD (major depressive disorder)    Alcohol Dependence    Chart reviewed.  It appears that navigator is working with primary therapist [facility].  Continue hospitalization through the weekend.

## 2017-12-02 NOTE — PLAN OF CARE
Problem: BH Patient Care Overview (Adult)  Goal: Plan of Care Review  Outcome: Ongoing (interventions implemented as appropriate)  Pt slept 8 hours he denies any anx /dep verbalizes meds are helping he verbalizes will be going to long term [facility] today     12/02/17 1807   Coping/Psychosocial Response Interventions   Plan Of Care Reviewed With patient   Coping/Psychosocial   Patient Agreement with Plan of Care agrees   Patient Care Overview   Progress improving         Problem: BH Overarching Goals  Goal: Adheres to Safety Considerations for Self and Others  Outcome: Ongoing (interventions implemented as appropriate)  Goal: Optimized Coping Skills in Response to Life Stressors  Outcome: Ongoing (interventions implemented as appropriate)  Goal: Develops/Participates in Therapeutic [place] to Support Successful Transition  Outcome: Ongoing (interventions implemented as appropriate)

## 2017-12-03 PROCEDURE — 99232 SBSQ HOSP IP/OBS MODERATE 35: CPT | Performed by: PSYCHIATRY & NEUROLOGY

## 2017-12-03 RX ORDER — IBUPROFEN 800 MG/1
800 TABLET ORAL EVERY 6 HOURS PRN
Status: DISCONTINUED | OUTPATIENT
Start: 2017-12-03 | End: 2017-12-04 | Stop reason: HOSPADM

## 2017-12-03 RX ADMIN — IBUPROFEN 800 MG: 800 TABLET ORAL at 13:19

## 2017-12-03 RX ADMIN — SERTRALINE 50 MG: 50 TABLET, FILM COATED ORAL at 08:51

## 2017-12-03 RX ADMIN — IBUPROFEN 600 MG: 600 TABLET ORAL at 04:35

## 2017-12-03 RX ADMIN — LIDOCAINE HYDROCHLORIDE 10 ML: 20 SOLUTION ORAL; TOPICAL at 23:15

## 2017-12-03 RX ADMIN — IBUPROFEN 800 MG: 800 TABLET ORAL at 23:15

## 2017-12-03 RX ADMIN — TRAZODONE HYDROCHLORIDE 50 MG: 50 TABLET ORAL at 20:58

## 2017-12-03 RX ADMIN — HYDROXYZINE HYDROCHLORIDE 50 MG: 50 TABLET ORAL at 20:58

## 2017-12-03 RX ADMIN — LIDOCAINE HYDROCHLORIDE 10 ML: 20 SOLUTION ORAL; TOPICAL at 13:19

## 2017-12-03 RX ADMIN — THIAMINE HCL (VITAMIN B1) 50 MG TABLET 100 MG: at 08:51

## 2017-12-03 RX ADMIN — NICOTINE 1 PATCH: 21 PATCH TRANSDERMAL at 08:51

## 2017-12-03 RX ADMIN — Medication 1 TABLET: at 08:51

## 2017-12-03 NOTE — PROGRESS NOTES
"      Inpatient Psy Progress Note   Clinician: Ricardo Noble MD  Admission Date: 11/28/2017  9:46 AM 12/03/17    Behavioral Health Treatment Plan and Problem List: I have reviewed and approved the Behavioral Health Treatment Plan and Problem list.    Allergies  Allergies   Allergen Reactions   • Nsaids GI Bleeding       Hospital Day: 5 days      Assessment completed within view of staff    History  CC: inpatient followup  Interval HPI: Patient seen and evaluated by me.  Chart reviewed.  He c/o tooth pain - caused him trouble sleeping last night.  Depression continues to improve.  Denies SI.  No psychosis.       Interval Review of Systems:   General ROS: negative for - fever or malaise  Endocrine ROS: negative for - palpitations  Respiratory ROS: no cough, shortness of breath, or wheezing  Cardiovascular ROS: no chest pain or dyspnea on exertion  Gastrointestinal ROS: no abdominal pain,no black or bloody stools    /79 (BP Location: Right arm, Patient Position: Sitting)  Pulse 93  Temp 97.2 °F (36.2 °C) (Temporal Artery )   Resp 18  Ht 65\" (165.1 cm)  Wt 136 lb 9.6 oz (62 kg)  SpO2 98%  BMI 22.73 kg/m2    Mental Status Exam  Mood: depressed  Affect: mood-congruent  Thought Processes: linear, logical, and goal directed  Thought Content:  Normal  Hallucinations: no  Suicidal Thoughts:  none  Suicidal Plan/Intent:none  Hopelesness: no  Homicidal Thoughts:  absent      Medical Decision Making:   Labs:     Lab Results (last 24 hours)     ** No results found for the last 24 hours. **            Radiology:     Imaging Results (last 24 hours)     ** No results found for the last 24 hours. **            EKG:     ECG/EMG Results (most recent)     Procedure Component Value Units Date/Time    ECG 12 Lead [[id number]] Collected:  11/28/17 1618     Updated:  11/29/17 1236    Narrative:       Test Reason : Potential adverse reaction to medications.  Blood Pressure : **/** mmHG  Vent. Rate : 069 BPM     Atrial Rate : 069 " BPM     P-R Int : 172 ms          QRS Dur : 084 ms      QT Int : 362 ms       P-R-T Axes : 075 081 067 degrees     QTc Int : 387 ms    Normal sinus rhythm with sinus arrhythmia  Normal ECG  No previous ECGs available  Confirmed by Savage Jha ([id number]) on 11/29/2017 12:36:49 PM    Referred By:  CONTRERAS           Confirmed By:Savage Jha           Medications:     multivitamin 1 tablet Oral Daily   nicotine 1 patch Transdermal Q24H   sertraline 50 mg Oral Daily   vitamin B-1 100 mg Oral Daily          All medications reviewed.      Assessment and Plan:    Active Problems:    MDD (major depressive disorder)       - Continue Zoloft. Continue hospitalization for safety and stabilization.  Continue current level of Special Precautions (q15 minute checks).        Alcohol Dependence      - Monitor for any additional signs of withdrawal.  [facility].      Tooth Pain      - Will provide viscous lidocaine and ibuprofen through the weekend.  He will need to followup with a Dentist after discharge.

## 2017-12-03 NOTE — PLAN OF CARE
Problem:  Patient Care Overview (Adult)  Goal: Plan of Care Review  Outcome: Ongoing (interventions implemented as appropriate)  Denied anxiety and rated depression as 3. Denies S.I. Says he felt down because he wanted to go home for a couple days before going to long-term rehab. Stays in his room a lot. Talks to his roommate during evening hours. Appeared to sleep up until around 0430 when he complained of toothache or sinus pain in his face. Says he couldn't really tell where pain was coming from. It was unrelieved by Motrin.     12/03/17 0547   Coping/Psychosocial Response Interventions   Plan Of Care Reviewed With patient   Coping/Psychosocial   Patient Agreement with Plan of Care agrees   Patient Care Overview   Progress improving         Problem:  Overarching Goals  Goal: Adheres to Safety Considerations for Self and Others  Outcome: Ongoing (interventions implemented as appropriate)  Goal: Optimized Coping Skills in Response to Life Stressors  Outcome: Ongoing (interventions implemented as appropriate)  Goal: Develops/Participates in Therapeutic [place] to Support Successful Transition  Outcome: Ongoing (interventions implemented as appropriate)

## 2017-12-03 NOTE — PLAN OF CARE
Problem: BH Patient Care Overview (Adult)  Goal: Plan of Care Review  Outcome: Ongoing (interventions implemented as appropriate)  Rates anxiety 0 and depression 3. Denies suicidal thoughts.   Goal: Interdisciplinary Rounds/Family Conference  Outcome: Ongoing (interventions implemented as appropriate)  Goal: Individualization and Mutuality  Outcome: Ongoing (interventions implemented as appropriate)  Goal: Discharge Needs Assessment  Outcome: Ongoing (interventions implemented as appropriate)    Problem:  Overarching Goals  Goal: Adheres to Safety Considerations for Self and Others  Outcome: Ongoing (interventions implemented as appropriate)  Goal: Optimized Coping Skills in Response to Life Stressors  Outcome: Ongoing (interventions implemented as appropriate)  Goal: Develops/Participates in Therapeutic [place] to Support Successful Transition  Outcome: Ongoing (interventions implemented as appropriate)    Problem: Anxiety (Adult)  Goal: Identify Related Risk Factors and Signs and Symptoms  Outcome: Ongoing (interventions implemented as appropriate)    Problem: Depression  Goal: Refrain from harming self  Outcome: Ongoing (interventions implemented as appropriate)    Problem: SUBSTANCE USE/ABUSE  Goal: By discharge, will develop insight into their chemical dependency and sustain motivation to continue in recovery.  Outcome: Ongoing (interventions implemented as appropriate)

## 2017-12-04 VITALS
HEIGHT: 65 IN | OXYGEN SATURATION: 98 % | DIASTOLIC BLOOD PRESSURE: 91 MMHG | WEIGHT: 136.6 LBS | SYSTOLIC BLOOD PRESSURE: 151 MMHG | TEMPERATURE: 97.7 F | HEART RATE: 78 BPM | RESPIRATION RATE: 18 BRPM | BODY MASS INDEX: 22.76 KG/M2

## 2017-12-04 RX ORDER — HYDROXYZINE 50 MG/1
50 TABLET, FILM COATED ORAL DAILY PRN
Qty: 15 TABLET | Refills: 0 | Status: SHIPPED | OUTPATIENT
Start: 2017-12-04 | End: 2017-12-19

## 2017-12-04 RX ORDER — IBUPROFEN 800 MG/1
800 TABLET ORAL EVERY 6 HOURS PRN
Qty: 10 TABLET | Refills: 0 | Status: SHIPPED | OUTPATIENT
Start: 2017-12-04 | End: 2017-12-07

## 2017-12-04 RX ORDER — MULTIVITAMIN
1 TABLET ORAL DAILY
Qty: 30 TABLET | Refills: 0 | Status: SHIPPED | OUTPATIENT
Start: 2017-12-05 | End: 2018-01-04

## 2017-12-04 RX ORDER — TRAZODONE HYDROCHLORIDE 50 MG/1
50 TABLET ORAL NIGHTLY PRN
Qty: 15 TABLET | Refills: 0 | Status: SHIPPED | OUTPATIENT
Start: 2017-12-04 | End: 2017-12-19

## 2017-12-04 RX ADMIN — LIDOCAINE HYDROCHLORIDE 10 ML: 20 SOLUTION ORAL; TOPICAL at 12:42

## 2017-12-04 RX ADMIN — IBUPROFEN 800 MG: 800 TABLET ORAL at 09:58

## 2017-12-04 RX ADMIN — SERTRALINE 50 MG: 50 TABLET, FILM COATED ORAL at 09:57

## 2017-12-04 RX ADMIN — NICOTINE 1 PATCH: 21 PATCH TRANSDERMAL at 09:57

## 2017-12-04 RX ADMIN — THIAMINE HCL (VITAMIN B1) 50 MG TABLET 100 MG: at 09:57

## 2017-12-04 RX ADMIN — Medication 1 TABLET: at 09:57

## 2017-12-04 NOTE — PROGRESS NOTES
"Therapist met with Patient again today at bedside. Therapist reviewed treatment progress and previous discussed disposition plan. Patient was informed that [facility] does not accept his insurance thus could not be admitted unless Patient agreeable to pay out of pocket. Therapist reminded Patient that he had a phone assessment this morning with [facility] for possible admission today but Patient refused to complete. Patient asked Therapist for his \"options\". Again, Therapist reviewed all possible options for Patient regarding residential services. But Patient was not agreeable for any. Patient requested Treatment Team to seek services in [place], [place]. Therapist acknowledged but also discussed Nilesh's insurance as a barrier. Therapist has discussed multiple residential programs with Patient however, Patient remains unsatisfied.     Patient will return home to his friend as desired disposition plan. Patient is planning on residing at [facility] on January 2, 2017. Therapist will also provide Patient with information about [facility], a free residential and homeless program.   "

## 2017-12-04 NOTE — PLAN OF CARE
Problem:  Patient Care Overview (Adult)  Goal: Plan of Care Review  Outcome: Ongoing (interventions implemented as appropriate)  Rates anxiety and depression as 3. Denies S.I. Out of room some during evening shift. Appeared to sleep well this shift. Anticipates discharge today.    12/04/17 0521   Coping/Psychosocial Response Interventions   Plan Of Care Reviewed With patient   Coping/Psychosocial   Patient Agreement with Plan of Care agrees   Patient Care Overview   Progress no change         Problem:  Overarching Goals  Goal: Adheres to Safety Considerations for Self and Others  Outcome: Ongoing (interventions implemented as appropriate)  Goal: Optimized Coping Skills in Response to Life Stressors  Outcome: Ongoing (interventions implemented as appropriate)  Goal: Develops/Participates in Therapeutic [place] to Support Successful Transition  Outcome: Ongoing (interventions implemented as appropriate)

## 2017-12-04 NOTE — PROGRESS NOTES
"[id number]  Data:  Therapist met with Patient this date individually. Patient agreeable to discuss discharge concerns and possible disposition plans. Therapist informed Patient of needed phone assessment for possible admission at [facility]. Patient reported that he has already completed a phone assessment and has an admission date of January 2, 2018 and does not need to complete another assessment reporting, \"they can pull all their notes up if they need too\". Patient continues on to say that he would \"rather look at other options\" [facility] because \"[facility]\". Patient reported that he hopes to be approved for [facility] and to his knowledge he is. Therapist addressed this concern. Therapist also reminded Patient of free programs in [place], [place] and [facility] in [place]. Patient acknowledged but not agreeable for admission. Therapist and Patient discussed [facility] as [facility] if not approved elsewhere. Patient agreed that if not approved with [facility] than he will return home until his admission with the [facility] in January.     Therapist contacted [facility] regarding Patient's referral however; was unsuccessful. Therapist efforts will continue.     Assessment:  Patient appeared to be focused and cooperative this date. Patient reports motivation for treatment however; due to his treatment expectations has limited his opinions. Patient seems hopeful for treatment at [facility] despite Therapist efforts of explaining possible disapproval of referral due to insurance barrier. Patient seems fixated on [facility] and not agreeable to other programs at this time. Patient observed to have a flat affect and mood today.     Plan:  Treatment Team's efforts will continue in hopes to review referral process with [facility].     If Patient is not approved for [facility], Patient plans to return to friend's home and await admission " date at [facility] for January. Therapist has previously contacted Patient's friend who was agreeable for Patient to return.     Patient has also been scheduled an outpatient appointment with Rivera ROBLES on December 8, 2017; if not approved for [facility].

## 2017-12-04 NOTE — PLAN OF CARE
Problem:  Patient Care Overview (Adult)  Goal: Interdisciplinary Rounds/Family Conference  Outcome: Ongoing (interventions implemented as appropriate)    12/04/17 1109   Interdisciplinary Rounds/Family Conf   Summary Dr. Stack and Carla    Participants patient;social work;psychiatrist;community support services      1100  Dr. Stack requested Therapist to meet with Patient again today for disposition plans. Therapist informed Dr. Stack of Patient's previous session with Therapist and refusal to complete phone assessment with [facility]. Dr. Stack reported that Patient continues to seek residential treatment and requested Therapist to discuss this with Patient again today.      Therapist contacted [facility] this date and spoke to Niki. Niki reported that the only [facility] medical insurance that is accepted is  at this time. Therapist will make Patient aware.

## 2017-12-04 NOTE — DISCHARGE SUMMARY
Date of Discharge:  12/4/2017    Presenting Problem/History of Present Illness  MDD (major depressive disorder) [F32.9]   Interval history   patient was seen today, he says he is doing well, denies depression or anxiety, rates both at   2/10.,  He has slept 8 hours, he denies craving for any drugs of withdrawal symptoms.  Denies suicidal thoughts homicidal thoughts hallucinations or paranoia.. He says he has been accepted at the rehabilitation facility in [facility] on 1/3/2018, this was prior to his admission to [facility].  He is also willing to consider any rehabilitation facility in [place],  Hospital Course  Patient was hospitalized on 7/28/2017  after he reported to the emergency room reporting suicidal thoughts, alcohol abuse and also auditory hallucinations and paranoia.  He was placed on special precautions level III and Ativan detox protocol.  I saw him on 11/29/2017 when I did the initial history and physical examination also started him on Zoloft 50 mg daily.  Patient was seen daily.  Patient completed his detox uneventfully.  He was seen by the on-call physician on the weekend of 12/2/2017 and 12/3/2017.  I resumed patient care on 12/4/2017 when he reported doing well as described above.  Plan was to discharge him, with his friend, after therapist had given him all the referral information about long-term drug rehabilitation.  An appointment was scheduled for him at [facility] in [facility] [place] [facility] on 12/8/2017.  Patient was informed about his admission date at [facility] in [facility] in January 2018.    Procedures Performed         Consults:   Consults     No orders found from 10/30/2017 to 11/29/2017.          Pertinent Test Results: Results Review:CMP is essentially within normal limits.  CBC has shown elevated MCV of 104.8 and MCH of 35.9.  Urine Analysis is negative.  Urine drug screen               is negative.  Serum alcohol level prior to admission was 24 on  arrival in the emergency room was 157.  EKG has shown sinus rhythm with sinus arrhythmia    Condition on Discharge:  Improved and stable      Discharge Disposition  Home or Self Care    Discharge Medications Vistaril 50 mg daily by mouth when necessary #30  Lidocaine viscous for 5 days  Multivitamin #30  Zoloft 50 mg daily by mouth #30  Trazodone 50 mg at bedtime by mouth when necessary for insomnia #30            Discharge Diagnosis: Assessment/Diagnosis: Major depression recurrent with psychosis  Alcohol dependence with alcohol withdrawal        Prognosis: Fair with psychiatric compliance            Maria Dolores Stack MD  12/04/17  11:52 AM

## 2018-01-17 RX ORDER — HYDROXYZINE 50 MG/1
50 TABLET, FILM COATED ORAL DAILY PRN
Qty: 15 TABLET | Refills: 0 | Status: CANCELLED | OUTPATIENT
Start: 2018-01-17 | End: 2018-02-01

## 2018-01-17 RX ORDER — TRAZODONE HYDROCHLORIDE 50 MG/1
50 TABLET ORAL NIGHTLY PRN
Qty: 15 TABLET | Refills: 0 | Status: CANCELLED | OUTPATIENT
Start: 2018-01-17 | End: 2018-02-01

## 2019-06-28 ENCOUNTER — OFFICE VISIT (OUTPATIENT)
Dept: FAMILY MEDICINE CLINIC | Facility: CLINIC | Age: 54
End: 2019-06-28

## 2019-06-28 VITALS
DIASTOLIC BLOOD PRESSURE: 86 MMHG | OXYGEN SATURATION: 99 % | TEMPERATURE: 98.6 F | WEIGHT: 152.2 LBS | SYSTOLIC BLOOD PRESSURE: 150 MMHG | BODY MASS INDEX: 25.36 KG/M2 | HEIGHT: 65 IN | HEART RATE: 102 BPM

## 2019-06-28 DIAGNOSIS — Z76.89 ENCOUNTER TO ESTABLISH CARE: ICD-10-CM

## 2019-06-28 DIAGNOSIS — N39.43 BENIGN PROSTATIC HYPERPLASIA WITH POST-VOID DRIBBLING: ICD-10-CM

## 2019-06-28 DIAGNOSIS — R03.0 ELEVATED BLOOD PRESSURE READING: ICD-10-CM

## 2019-06-28 DIAGNOSIS — H69.93 DISORDER OF BOTH EUSTACHIAN TUBES: ICD-10-CM

## 2019-06-28 DIAGNOSIS — K64.4 EXTERNAL HEMORRHOIDS: ICD-10-CM

## 2019-06-28 DIAGNOSIS — N40.1 BENIGN PROSTATIC HYPERPLASIA WITH POST-VOID DRIBBLING: ICD-10-CM

## 2019-06-28 DIAGNOSIS — N41.9 PROSTATITIS, UNSPECIFIED PROSTATITIS TYPE: ICD-10-CM

## 2019-06-28 DIAGNOSIS — N40.0 ENLARGED PROSTATE ON RECTAL EXAMINATION: ICD-10-CM

## 2019-06-28 DIAGNOSIS — F17.200 CURRENT EVERY DAY SMOKER: ICD-10-CM

## 2019-06-28 DIAGNOSIS — R30.0 DYSURIA: ICD-10-CM

## 2019-06-28 DIAGNOSIS — R10.31 RIGHT GROIN PAIN: Primary | ICD-10-CM

## 2019-06-28 LAB
ALBUMIN SERPL-MCNC: 4.7 G/DL (ref 3.5–5.2)
ALBUMIN/GLOB SERPL: 1.7 G/DL
ALP SERPL-CCNC: 92 U/L (ref 39–117)
ALT SERPL W P-5'-P-CCNC: 25 U/L (ref 1–41)
ANION GAP SERPL CALCULATED.3IONS-SCNC: 12.8 MMOL/L (ref 5–15)
ANISOCYTOSIS BLD QL: ABNORMAL
AST SERPL-CCNC: 25 U/L (ref 1–40)
BILIRUB SERPL-MCNC: 0.4 MG/DL (ref 0.2–1.2)
BILIRUB UR QL STRIP: NEGATIVE
BUN BLD-MCNC: 11 MG/DL (ref 6–20)
BUN/CREAT SERPL: 13.8 (ref 7–25)
CALCIUM SPEC-SCNC: 10.2 MG/DL (ref 8.6–10.5)
CHLORIDE SERPL-SCNC: 93 MMOL/L (ref 98–107)
CHOLEST SERPL-MCNC: 250 MG/DL (ref 0–200)
CLARITY UR: CLEAR
CO2 SERPL-SCNC: 26.2 MMOL/L (ref 22–29)
COLOR UR: YELLOW
CREAT BLD-MCNC: 0.8 MG/DL (ref 0.76–1.27)
DEPRECATED RDW RBC AUTO: 50.4 FL (ref 37–54)
ERYTHROCYTE [DISTWIDTH] IN BLOOD BY AUTOMATED COUNT: 12.7 % (ref 12.3–15.4)
GFR SERPL CREATININE-BSD FRML MDRD: 101 ML/MIN/1.73
GLOBULIN UR ELPH-MCNC: 2.8 GM/DL
GLUCOSE BLD-MCNC: 116 MG/DL (ref 65–99)
GLUCOSE UR STRIP-MCNC: NEGATIVE MG/DL
HCT VFR BLD AUTO: 46.6 % (ref 37.5–51)
HDLC SERPL-MCNC: 91 MG/DL (ref 40–60)
HGB BLD-MCNC: 15.4 G/DL (ref 13–17.7)
HGB UR QL STRIP.AUTO: NEGATIVE
KETONES UR QL STRIP: NEGATIVE
LDLC SERPL CALC-MCNC: 146 MG/DL (ref 0–100)
LDLC/HDLC SERPL: 1.6 {RATIO}
LEUKOCYTE ESTERASE UR QL STRIP.AUTO: NEGATIVE
LYMPHOCYTES # BLD MANUAL: 1.38 10*3/MM3 (ref 0.7–3.1)
LYMPHOCYTES NFR BLD MANUAL: 18 % (ref 19.6–45.3)
LYMPHOCYTES NFR BLD MANUAL: 3 % (ref 5–12)
MACROCYTES BLD QL SMEAR: ABNORMAL
MCH RBC QN AUTO: 34.9 PG (ref 26.6–33)
MCHC RBC AUTO-ENTMCNC: 33 G/DL (ref 31.5–35.7)
MCV RBC AUTO: 105.7 FL (ref 79–97)
MONOCYTES # BLD AUTO: 0.23 10*3/MM3 (ref 0.1–0.9)
NEUTROPHILS # BLD AUTO: 6.07 10*3/MM3 (ref 1.7–7)
NEUTROPHILS NFR BLD MANUAL: 79 % (ref 42.7–76)
NITRITE UR QL STRIP: NEGATIVE
PH UR STRIP.AUTO: 7.5 [PH] (ref 5–8)
PLAT MORPH BLD: NORMAL
PLATELET # BLD AUTO: 231 10*3/MM3 (ref 140–450)
PMV BLD AUTO: 9.4 FL (ref 6–12)
POTASSIUM BLD-SCNC: 4.6 MMOL/L (ref 3.5–5.2)
PROT SERPL-MCNC: 7.5 G/DL (ref 6–8.5)
PROT UR QL STRIP: NEGATIVE
PSA SERPL-MCNC: 6.73 NG/ML (ref 0–4)
RBC # BLD AUTO: 4.41 10*6/MM3 (ref 4.14–5.8)
SODIUM BLD-SCNC: 132 MMOL/L (ref 136–145)
SP GR UR STRIP: 1.02 (ref 1–1.03)
TRIGL SERPL-MCNC: 66 MG/DL (ref 0–150)
UROBILINOGEN UR QL STRIP: NORMAL
VLDLC SERPL-MCNC: 13.2 MG/DL (ref 5–40)
WBC MORPH BLD: NORMAL
WBC NRBC COR # BLD: 7.68 10*3/MM3 (ref 3.4–10.8)

## 2019-06-28 PROCEDURE — 81003 URINALYSIS AUTO W/O SCOPE: CPT | Performed by: FAMILY MEDICINE

## 2019-06-28 PROCEDURE — 99204 OFFICE O/P NEW MOD 45 MIN: CPT | Performed by: FAMILY MEDICINE

## 2019-06-28 PROCEDURE — 85007 BL SMEAR W/DIFF WBC COUNT: CPT | Performed by: FAMILY MEDICINE

## 2019-06-28 PROCEDURE — 80061 LIPID PANEL: CPT | Performed by: FAMILY MEDICINE

## 2019-06-28 PROCEDURE — 80053 COMPREHEN METABOLIC PANEL: CPT | Performed by: FAMILY MEDICINE

## 2019-06-28 PROCEDURE — 84153 ASSAY OF PSA TOTAL: CPT | Performed by: FAMILY MEDICINE

## 2019-06-28 PROCEDURE — 85025 COMPLETE CBC W/AUTO DIFF WBC: CPT | Performed by: FAMILY MEDICINE

## 2019-06-28 RX ORDER — FLUTICASONE PROPIONATE 50 MCG
2 SPRAY, SUSPENSION (ML) NASAL DAILY
Qty: 18.2 ML | Refills: 3 | Status: SHIPPED | OUTPATIENT
Start: 2019-06-28 | End: 2020-03-13

## 2019-06-28 RX ORDER — GUAIFENESIN 600 MG/1
1200 TABLET, EXTENDED RELEASE ORAL 2 TIMES DAILY
COMMUNITY
End: 2020-03-13

## 2019-06-28 RX ORDER — SULFAMETHOXAZOLE AND TRIMETHOPRIM 800; 160 MG/1; MG/1
1 TABLET ORAL 2 TIMES DAILY
Qty: 28 TABLET | Refills: 0 | Status: SHIPPED | OUTPATIENT
Start: 2019-06-28 | End: 2019-07-12

## 2019-06-28 NOTE — PROGRESS NOTES
Subjective   Clay Mark is a 54 y.o. male.   Pt presents today with CC of [facility]; Groin Pain; Diarrhea; and Nausea      History of Present Illness   1.  Patient is here to [facility].  #2 he complains of several weeks of right groin pain, frequent dribbling after urination, painful ejaculation, and pelvic discomfort when standing up straight.  He reports that pain is being dull in nature.  He denies bleeding.  He has had more loose bowel movements over the past month or so than normal for him.  Reportedly, he was referred to a surgeon who did not feel as though he had a hernia.  His groin pain is on the right side medial to the AIIS.  Patient reports he does have a bulge there that is not normal for him.      Lower Extremity Issue   Associated symptoms include abdominal pain. Pertinent negatives include no arthralgias, chest pain, chills, congestion, coughing, fever or sore throat. The symptoms are aggravated by movement, palpation and weight bearing.        The following portions of the patient's history were reviewed and updated as appropriate: allergies, current medications, past family history, past social history, past surgical history and problem list.    Review of Systems   Constitutional: Negative for chills, fever and unexpected weight loss.   HENT: Negative for congestion and sore throat.    Eyes: Negative for blurred vision and visual disturbance.   Respiratory: Negative for cough and wheezing.    Cardiovascular: Negative for chest pain and palpitations.   Gastrointestinal: Positive for abdominal pain. Negative for blood in stool and constipation.   Endocrine: Negative for cold intolerance and heat intolerance.   Genitourinary: Positive for penile pain. Negative for discharge, dysuria, penile swelling, scrotal swelling and testicular pain.   Musculoskeletal: Negative for arthralgias and neck stiffness.   Neurological: Negative for dizziness, seizures and syncope.   Psychiatric/Behavioral:  Negative for self-injury, suicidal ideas and depressed mood.       Objective   Physical Exam   Constitutional: He is oriented to person, place, and time. He appears well-developed and well-nourished.   HENT:   Head: Normocephalic and atraumatic.   Right Ear: External ear normal.   Left Ear: External ear normal.   Nose: Nose normal.   Mouth/Throat: Oropharynx is clear and moist.   Eyes: Conjunctivae and EOM are normal. Pupils are equal, round, and reactive to light.   Neck: Normal range of motion. Neck supple.   Cardiovascular: Normal rate, regular rhythm and normal heart sounds.   Pulmonary/Chest: Effort normal and breath sounds normal.   Abdominal: Soft. Bowel sounds are normal.   Genitourinary:   Genitourinary Comments: 3 small nonthrombosed external hemorrhoids, empty rectal vault, normal rectal tone.  Prostate is approximately 30 g and is moderately tender to palpation.  It is not particularly hot or swollen.  Small right inguinal hernia noted on exam.  Penis and testicles normal.   Neurological: He is alert and oriented to person, place, and time.   Skin: Skin is warm and dry.   Psychiatric: He has a normal mood and affect. His behavior is normal.         Assessment/Plan   Clay was seen today for establish care, groin pain, diarrhea and nausea.    Diagnoses and all orders for this visit:    Right groin pain    Prostatitis, unspecified prostatitis type  -     PSA DIAGNOSTIC; Future  -     CBC Auto Differential; Future  -     sulfamethoxazole-trimethoprim (BACTRIM DS) 800-160 MG per tablet; Take 1 tablet by mouth 2 (Two) Times a Day.  -     PSA DIAGNOSTIC  -     CBC Auto Differential  -     Manual Differential; Future  -     Manual Differential  Will treat with Bactrim for 2 weeks.  We will get a PSA because of tender enlarged prostate with lower urinary tract symptoms.  His right groin pain may be associated with prostatitis, I do not feel as though his prostatitis is severe, possibly chronic.  On exam I felt  as though he had a small right inguinal hernia, may get a second opinion from another surgeon as his groin pain is a major concern to him.  If he has a problem with this medication he is to call the clinic.  No need for urology referral at this time.  Enlarged prostate on rectal examination  -     PSA DIAGNOSTIC; Future  -     PSA DIAGNOSTIC    External hemorrhoids    Elevated blood pressure reading  -     CBC Auto Differential; Future  -     Comprehensive Metabolic Panel; Future  -     Lipid Panel; Future  -     CBC Auto Differential  -     Comprehensive Metabolic Panel  -     Lipid Panel  -     Manual Differential; Future  -     Manual Differential  Will get baseline labs and will consider starting treatment for hypertension at follow-up.  If you were to quit smoking his blood pressure would probably normalize.  Encounter to establish care  -     Lipid Panel; Future  -     Lipid Panel    Current every day smoker    Benign prostatic hyperplasia with post-void dribbling  -     PSA DIAGNOSTIC; Future  -     PSA DIAGNOSTIC    Disorder of both eustachian tubes  -     fluticasone (FLONASE) 50 MCG/ACT nasal spray; 2 sprays into the nostril(s) as directed by provider Daily.    Dysuria  -     Urinalysis With Culture If Indicated - Urine, Clean Catch                 I advised Clay of the risks of continuing to use tobacco, and I provided him with tobacco cessation educational materials in the After Visit Summary.     During this visit, I spent 3 minutes counseling the patient regarding tobacco cessation.    Patient's Body mass index is 25.33 kg/m². BMI is above normal parameters. Recommendations include: nutrition counseling.

## 2019-07-08 ENCOUNTER — TELEPHONE (OUTPATIENT)
Dept: FAMILY MEDICINE CLINIC | Facility: CLINIC | Age: 54
End: 2019-07-08

## 2019-07-08 NOTE — TELEPHONE ENCOUNTER
Called patient to verify his insurance because I am working on a PA for flonase through [employer] and they can not find matching patient with the information I have.

## 2019-07-09 ENCOUNTER — TELEPHONE (OUTPATIENT)
Dept: FAMILY MEDICINE CLINIC | Facility: CLINIC | Age: 54
End: 2019-07-09

## 2019-07-09 DIAGNOSIS — H69.93 DISORDER OF BOTH EUSTACHIAN TUBES: ICD-10-CM

## 2019-07-09 NOTE — TELEPHONE ENCOUNTER
Left message for patient to let him know PA was approved for his flonase and he could  at the pharmacy.

## 2019-07-12 ENCOUNTER — OFFICE VISIT (OUTPATIENT)
Dept: FAMILY MEDICINE CLINIC | Facility: CLINIC | Age: 54
End: 2019-07-12

## 2019-07-12 VITALS
HEART RATE: 95 BPM | WEIGHT: 149.8 LBS | BODY MASS INDEX: 24.96 KG/M2 | SYSTOLIC BLOOD PRESSURE: 105 MMHG | OXYGEN SATURATION: 97 % | DIASTOLIC BLOOD PRESSURE: 85 MMHG | TEMPERATURE: 98.5 F | HEIGHT: 65 IN

## 2019-07-12 DIAGNOSIS — R10.31 GROIN PAIN, RIGHT: ICD-10-CM

## 2019-07-12 DIAGNOSIS — N41.9 PROSTATITIS, UNSPECIFIED PROSTATITIS TYPE: Primary | ICD-10-CM

## 2019-07-12 DIAGNOSIS — M25.551 RIGHT HIP PAIN: ICD-10-CM

## 2019-07-12 DIAGNOSIS — G89.29 CHRONIC RIGHT-SIDED LOW BACK PAIN, WITH SCIATICA PRESENCE UNSPECIFIED: ICD-10-CM

## 2019-07-12 DIAGNOSIS — M54.5 CHRONIC RIGHT-SIDED LOW BACK PAIN, WITH SCIATICA PRESENCE UNSPECIFIED: ICD-10-CM

## 2019-07-12 LAB
BILIRUB BLD-MCNC: NEGATIVE MG/DL
COLOR UR: ABNORMAL
GLUCOSE UR STRIP-MCNC: NEGATIVE MG/DL
KETONES UR QL: ABNORMAL
LEUKOCYTE EST, POC: NEGATIVE
NITRITE UR-MCNC: NEGATIVE MG/ML
PH UR: 6.5 [PH] (ref 5–8)
PROT UR STRIP-MCNC: NEGATIVE MG/DL
RBC # UR STRIP: NEGATIVE /UL
SP GR UR: 1.01 (ref 1–1.03)
UROBILINOGEN UR QL: NORMAL

## 2019-07-12 PROCEDURE — 99214 OFFICE O/P EST MOD 30 MIN: CPT | Performed by: FAMILY MEDICINE

## 2019-07-12 NOTE — PROGRESS NOTES
"Subjective   Clay Mark is a 54 y.o. male.   Pt presents today with CC of Earache; Hernia; and Follow-up (\"prostate problems\")      History of Present Illness   Patient is here to follow-up on prostatitis.  He reports improved pelvic pain, though his right groin pain has not been improved.  He would like reevaluation.  He was told by surgeon recently that he did not feel as though he had a hernia.  Patient also complains of bilateral earache.  He takes Flonase and Mucinex for allergy symptoms.  He denies fever or ear drainage.  He denies hearing loss.  He would like evaluation.         The following portions of the patient's history were reviewed and updated as appropriate: allergies, current medications, past family history, past social history, past surgical history and problem list.    Review of Systems   Constitutional: Negative for chills, fever and unexpected weight loss.   HENT: Positive for ear pain. Negative for congestion, ear discharge and sore throat.    Eyes: Negative for blurred vision and visual disturbance.   Respiratory: Negative for cough and wheezing.    Cardiovascular: Negative for chest pain and palpitations.   Gastrointestinal: Negative for abdominal pain and diarrhea.   Endocrine: Negative for cold intolerance and heat intolerance.   Genitourinary: Negative for dysuria.   Musculoskeletal: Positive for back pain. Negative for arthralgias and neck stiffness.   Neurological: Negative for dizziness, seizures and syncope.   Psychiatric/Behavioral: Negative for self-injury, suicidal ideas and depressed mood.       Objective   Physical Exam   Constitutional: He is oriented to person, place, and time. He appears well-developed and well-nourished.   HENT:   Head: Normocephalic and atraumatic.   Right Ear: External ear normal.   Left Ear: External ear normal.   Nose: Nose normal.   Mouth/Throat: Oropharynx is clear and moist.   Eyes: Conjunctivae and EOM are normal. Pupils are equal, round, and " reactive to light.   Neck: Normal range of motion. Neck supple.   Cardiovascular: Normal rate, regular rhythm and normal heart sounds.   Pulmonary/Chest: Effort normal and breath sounds normal.   Abdominal: Soft. Bowel sounds are normal.   Genitourinary:   Genitourinary Comments: Nontender prostate.  Questionable right inguinal hernia.   Musculoskeletal:   FADIR was positive on the right, it is unclear if this represents hip pathology or something in his right groin causing problems.   knees, and ankles with full range of motion and 5/5 strength bilaterally. DTRs symmetrical. Straight leg raise test negative bilaterally.     Neurological: He is alert and oriented to person, place, and time.   Skin: Skin is warm and dry.   Psychiatric: He has a normal mood and affect. His behavior is normal.   Nursing note and vitals reviewed.        Assessment/Plan   Clay was seen today for earache, hernia and follow-up.    Diagnoses and all orders for this visit:    Prostatitis, unspecified prostatitis type  -     POCT urinalysis dipstick, automated  Urinalysis essentially normal today.  His pelvic discomfort has improved.   Chronic right-sided low back pain, with sciatica presence unspecified  -     XR Spine Lumbar 2 or 3 View; Future  He has chronic low back pain, it is a possible reason for his right groin pain, as this could represent radiation.  I do not believe this is radiation of pain from his back though I cannot be sure.  Right hip pain  -     XR Hip With or Without Pelvis 2 - 3 View Right; Future  We will get an x-ray of his hip because he had a positive FADIR.    Groin pain, right  -     Ambulatory Referral to General Surgery    I would like to get a second surgical opinion on whether or not this represents a hernia.  His initial injury, and failure to improve with rest suggests hernia.  Getting x-rays to ensure no hip or low back pathology.    His ear exam was normal today.  Reassurance given.             Patient's  Body mass index is 24.93 kg/m². BMI is above normal parameters. Recommendations include: exercise counseling and nutrition counseling.

## 2019-07-18 ENCOUNTER — HOSPITAL ENCOUNTER (OUTPATIENT)
Dept: GENERAL RADIOLOGY | Facility: HOSPITAL | Age: 54
Discharge: HOME | End: 2019-07-18
Admitting: FAMILY MEDICINE

## 2019-07-18 ENCOUNTER — OFFICE VISIT (OUTPATIENT)
Dept: SURGERY | Facility: CLINIC | Age: 54
End: 2019-07-18

## 2019-07-18 VITALS
OXYGEN SATURATION: 100 % | DIASTOLIC BLOOD PRESSURE: 76 MMHG | WEIGHT: 151.4 LBS | HEART RATE: 106 BPM | RESPIRATION RATE: 16 BRPM | HEIGHT: 65 IN | TEMPERATURE: 97.2 F | SYSTOLIC BLOOD PRESSURE: 109 MMHG | BODY MASS INDEX: 25.22 KG/M2

## 2019-07-18 DIAGNOSIS — R19.7 DIARRHEA, UNSPECIFIED TYPE: Primary | ICD-10-CM

## 2019-07-18 DIAGNOSIS — K40.20 NON-RECURRENT BILATERAL INGUINAL HERNIA WITHOUT OBSTRUCTION OR GANGRENE: ICD-10-CM

## 2019-07-18 DIAGNOSIS — M25.551 RIGHT HIP PAIN: ICD-10-CM

## 2019-07-18 DIAGNOSIS — M54.5 CHRONIC RIGHT-SIDED LOW BACK PAIN, WITH SCIATICA PRESENCE UNSPECIFIED: ICD-10-CM

## 2019-07-18 DIAGNOSIS — G89.29 CHRONIC RIGHT-SIDED LOW BACK PAIN, WITH SCIATICA PRESENCE UNSPECIFIED: ICD-10-CM

## 2019-07-18 PROCEDURE — 72100 X-RAY EXAM L-S SPINE 2/3 VWS: CPT | Performed by: RADIOLOGY

## 2019-07-18 PROCEDURE — 72100 X-RAY EXAM L-S SPINE 2/3 VWS: CPT

## 2019-07-18 PROCEDURE — 73502 X-RAY EXAM HIP UNI 2-3 VIEWS: CPT

## 2019-07-18 PROCEDURE — 73502 X-RAY EXAM HIP UNI 2-3 VIEWS: CPT | Performed by: RADIOLOGY

## 2019-07-18 PROCEDURE — 99204 OFFICE O/P NEW MOD 45 MIN: CPT | Performed by: SURGERY

## 2019-07-18 NOTE — PROGRESS NOTES
7/18/2019    Patient Information  Clay Mark  [address]  [place] [address]  1965  [phone] (home)     Chief Complaint   Patient presents with   • Hernia     Referred for [facility]        Patient is a 54-year-old white male referred by Dr. Williamson.  Patient is complaining of a bulge in the right inguinal area.  He was referred to another surgeon from the emergency room at [facility].  He reports the surgeon told him there was not a hernia.  This all started approximately 5 weeks ago after he was doing some heavy lifting.  Patient is also complaining of nausea vomiting, loss of appetite, diarrhea, constipation, change in stool, and abdominal pain.  1 of the main things he seems to be concerned about his change in caliber of his stool.  He has had GI bleeds x2 in the past.  20 years ago he said he had received 4 units of blood because of GI bleeding.  The source of bleeding was  not able to be determined.  He is also very concerned about the GI symptoms and asked me if I would address those as well.    The Past medical history, family history, social history, medication list, allergies, and Review of Systems has been reviewed and confirmed.      General: fever, chills  Integumentary: rash and non-healing wound  Eyes: eyesight problems, glasses  ENT: earache  Respiratory: negative  Gastrointestinal: nausea/vomiting, loss of appetite, diarrhea, constipation, change in stool and abdominal pain  Cardiovascular: negative  Neurological: numbness  Psychiatric: anxiety, depression, insomnia and mood swings  Hematologic/Lymphatic: easy bleeding and easy bruising  Genitourinary: incontinence, painful urination and frequent urination  Musculoskeletal: painful joints, sore muscles, back pain and joint stiffness  Endocrine: hot flashes  Breasts: negative      Patient Active Problem List   Diagnosis   • MDD (major depressive disorder)         Past Medical History:   Diagnosis Date   • Alcohol abuse    •  "Anxiety    • Bipolar disorder (CMS/[facility])    • Depression    • GERD (gastroesophageal reflux disease)    • History of transfusion    • Psychiatric illness    • Suicide attempt (CMS/HCC)          Past Surgical History:   Procedure Laterality Date   • APPENDECTOMY     • KNEE SURGERY     • ORIF ELBOW FRACTURE      Right elbow   • TONSILLECTOMY           Family History   Problem Relation Age of Onset   • No Known Problems Mother    • No Known Problems Father          Social History     Tobacco Use   • Smoking status: Current Every Day Smoker     Packs/day: 2.50     Years: 30.00     Pack years: 75.00     Types: Cigarettes   • Smokeless tobacco: Never Used   Substance Use Topics   • Alcohol use: Yes     Alcohol/week: 7.2 oz     Types: 12 Cans of beer per week     Comment: Last used 11/27/17   • Drug use: No       Current Outpatient Medications   Medication Sig Dispense Refill   • fluticasone (FLONASE) 50 MCG/ACT nasal spray 2 sprays into the nostril(s) as directed by provider Daily. 18.2 mL 3   • guaiFENesin (MUCINEX) 600 MG 12 hr tablet Take 1,200 mg by mouth 2 (Two) Times a Day.       No current facility-administered medications for this visit.          Allergies  Nsaids    /76   Pulse 106   Temp 97.2 °F (36.2 °C)   Resp 16   Ht 165.1 cm (65\")   Wt 68.7 kg (151 lb 6.4 oz)   SpO2 100%   BMI 25.19 kg/m²        General :  Patient is a 54 y.o. male in no acute distress.    HEENT:  Normocephalic, pupils equally round and reactive to light, extraocular motions intact.  Chest:  Clear bilaterally. Equal breath sounds. No rales or rhonchi.  Heart:   Regular rate and rhythm.  Abdomen:  Soft, nontender. No distention.  :  Normal external genitalia.  Obvious right inguinal hernia which is reducible.  Suggestion of very small bulge on the left side with cough.  Rectal:  Not performed.  Extremities:  No clubbing cyanosis or edema. Good femoral, popliteal, dorsalis pedis and posterior tibial pulse.  Neurologic:  Patient " oriented ×3. Cranial nerves grossly intact. No sensory,cellebellar, or motor dysfunction.                ASSESSMENT  Bilateral inguinal hernia  Multiple GI complaints        PLAN    We will proceed with a panel followed by EGD and colonoscopy followed by laparoscopic bilateral inguinal hernia repair.    Patient's Body mass index is 25.19 kg/m². BMI is above normal parameters. Recommendations include: educational material.           This document signed by Akash Oro MD July 18, 2019 10:17 AM

## 2019-07-19 ENCOUNTER — TELEPHONE (OUTPATIENT)
Dept: FAMILY MEDICINE CLINIC | Facility: CLINIC | Age: 54
End: 2019-07-19

## 2019-07-19 NOTE — TELEPHONE ENCOUNTER
----- Message from Edgar Wisdom DO sent at 7/18/2019  9:51 PM EDT -----  Your hip and back x-rays returned.  Showed mild low back degenerative disc disease, hips were normal.  Your hips and your back are unlikely to be causing her groin pain.  Once you have your hernias taking care of, we can work on helping with your chronic pain.        Left a message to return call.    Patient returned call & verbalized understanding.

## 2019-07-22 ENCOUNTER — LAB (OUTPATIENT)
Dept: LAB | Facility: HOSPITAL | Age: 54
End: 2019-07-22

## 2019-07-22 DIAGNOSIS — R19.7 DIARRHEA, UNSPECIFIED TYPE: ICD-10-CM

## 2019-07-22 LAB
ADV 40+41 DNA STL QL NAA+NON-PROBE: NOT DETECTED
ASTRO TYP 1-8 RNA STL QL NAA+NON-PROBE: NOT DETECTED
C CAYETANENSIS DNA STL QL NAA+NON-PROBE: NOT DETECTED
CAMPY SP DNA.DIARRHEA STL QL NAA+PROBE: NOT DETECTED
CRYPTOSP STL CULT: NOT DETECTED
E COLI DNA SPEC QL NAA+PROBE: NOT DETECTED
E HISTOLYT AG STL-ACNC: NOT DETECTED
EAEC PAA PLAS AGGR+AATA ST NAA+NON-PRB: NOT DETECTED
EC STX1 + STX2 GENES STL NAA+PROBE: NOT DETECTED
EPEC EAE GENE STL QL NAA+NON-PROBE: NOT DETECTED
ETEC LTA+ST1A+ST1B TOX ST NAA+NON-PROBE: NOT DETECTED
G LAMBLIA DNA SPEC QL NAA+PROBE: NOT DETECTED
NOROVIRUS GI+II RNA STL QL NAA+NON-PROBE: NOT DETECTED
P SHIGELLOIDES DNA STL QL NAA+NON-PROBE: NOT DETECTED
RV RNA STL NAA+PROBE: NOT DETECTED
SALMONELLA DNA SPEC QL NAA+PROBE: NOT DETECTED
SAPO I+II+IV+V RNA STL QL NAA+NON-PROBE: NOT DETECTED
SHIGELLA SP+EIEC IPAH STL QL NAA+PROBE: NOT DETECTED
V CHOLERAE DNA SPEC QL NAA+PROBE: NOT DETECTED
VIBRIO DNA SPEC NAA+PROBE: NOT DETECTED
YERSINIA STL CULT: NOT DETECTED

## 2019-07-22 PROCEDURE — 0097U HC BIOFIRE FILMARRAY GI PANEL: CPT

## 2019-07-26 ENCOUNTER — ANESTHESIA (OUTPATIENT)
Dept: PERIOP | Facility: HOSPITAL | Age: 54
End: 2019-07-26

## 2019-07-26 ENCOUNTER — ANESTHESIA EVENT (OUTPATIENT)
Dept: PERIOP | Facility: HOSPITAL | Age: 54
End: 2019-07-26

## 2019-07-26 ENCOUNTER — HOSPITAL ENCOUNTER (OUTPATIENT)
Facility: HOSPITAL | Age: 54
Setting detail: HOSPITAL OUTPATIENT SURGERY
Discharge: HOME | End: 2019-07-26
Attending: SURGERY | Admitting: SURGERY

## 2019-07-26 VITALS
TEMPERATURE: 97.1 F | SYSTOLIC BLOOD PRESSURE: 136 MMHG | RESPIRATION RATE: 20 BRPM | OXYGEN SATURATION: 95 % | WEIGHT: 150 LBS | HEIGHT: 65 IN | DIASTOLIC BLOOD PRESSURE: 78 MMHG | BODY MASS INDEX: 24.99 KG/M2 | HEART RATE: 57 BPM

## 2019-07-26 DIAGNOSIS — K40.20 NON-RECURRENT BILATERAL INGUINAL HERNIA WITHOUT OBSTRUCTION OR GANGRENE: ICD-10-CM

## 2019-07-26 DIAGNOSIS — R19.7 DIARRHEA, UNSPECIFIED TYPE: ICD-10-CM

## 2019-07-26 PROCEDURE — 25010000002 MIDAZOLAM PER 1 MG: Performed by: NURSE ANESTHETIST, CERTIFIED REGISTERED

## 2019-07-26 PROCEDURE — 25010000002 FENTANYL CITRATE (PF) 100 MCG/2ML SOLUTION: Performed by: NURSE ANESTHETIST, CERTIFIED REGISTERED

## 2019-07-26 PROCEDURE — 25010000002 PROPOFOL 10 MG/ML EMULSION: Performed by: NURSE ANESTHETIST, CERTIFIED REGISTERED

## 2019-07-26 PROCEDURE — 45378 DIAGNOSTIC COLONOSCOPY: CPT | Performed by: SURGERY

## 2019-07-26 PROCEDURE — 43239 EGD BIOPSY SINGLE/MULTIPLE: CPT | Performed by: SURGERY

## 2019-07-26 RX ORDER — SODIUM CHLORIDE 0.9 % (FLUSH) 0.9 %
3 SYRINGE (ML) INJECTION EVERY 12 HOURS SCHEDULED
Status: DISCONTINUED | OUTPATIENT
Start: 2019-07-26 | End: 2019-07-26 | Stop reason: HOSPADM

## 2019-07-26 RX ORDER — IPRATROPIUM BROMIDE AND ALBUTEROL SULFATE 2.5; .5 MG/3ML; MG/3ML
3 SOLUTION RESPIRATORY (INHALATION) ONCE AS NEEDED
Status: DISCONTINUED | OUTPATIENT
Start: 2019-07-26 | End: 2019-07-26 | Stop reason: HOSPADM

## 2019-07-26 RX ORDER — SODIUM CHLORIDE 0.9 % (FLUSH) 0.9 %
3-10 SYRINGE (ML) INJECTION AS NEEDED
Status: DISCONTINUED | OUTPATIENT
Start: 2019-07-26 | End: 2019-07-26 | Stop reason: HOSPADM

## 2019-07-26 RX ORDER — SODIUM CHLORIDE, SODIUM LACTATE, POTASSIUM CHLORIDE, CALCIUM CHLORIDE 600; 310; 30; 20 MG/100ML; MG/100ML; MG/100ML; MG/100ML
125 INJECTION, SOLUTION INTRAVENOUS CONTINUOUS
Status: DISCONTINUED | OUTPATIENT
Start: 2019-07-26 | End: 2019-07-26 | Stop reason: HOSPADM

## 2019-07-26 RX ORDER — FENTANYL CITRATE 50 UG/ML
INJECTION, SOLUTION INTRAMUSCULAR; INTRAVENOUS AS NEEDED
Status: DISCONTINUED | OUTPATIENT
Start: 2019-07-26 | End: 2019-07-26 | Stop reason: SURG

## 2019-07-26 RX ORDER — FENTANYL CITRATE 50 UG/ML
50 INJECTION, SOLUTION INTRAMUSCULAR; INTRAVENOUS
Status: DISCONTINUED | OUTPATIENT
Start: 2019-07-26 | End: 2019-07-26 | Stop reason: HOSPADM

## 2019-07-26 RX ORDER — PROPOFOL 10 MG/ML
VIAL (ML) INTRAVENOUS AS NEEDED
Status: DISCONTINUED | OUTPATIENT
Start: 2019-07-26 | End: 2019-07-26 | Stop reason: SURG

## 2019-07-26 RX ORDER — GUAIFENESIN 600 MG/1
1200 TABLET, EXTENDED RELEASE ORAL 2 TIMES DAILY
Status: DISCONTINUED | OUTPATIENT
Start: 2019-07-26 | End: 2019-07-26 | Stop reason: HOSPADM

## 2019-07-26 RX ORDER — FLUTICASONE PROPIONATE 50 MCG
2 SPRAY, SUSPENSION (ML) NASAL DAILY
Status: DISCONTINUED | OUTPATIENT
Start: 2019-07-27 | End: 2019-07-26 | Stop reason: HOSPADM

## 2019-07-26 RX ORDER — MIDAZOLAM HYDROCHLORIDE 1 MG/ML
INJECTION INTRAMUSCULAR; INTRAVENOUS AS NEEDED
Status: DISCONTINUED | OUTPATIENT
Start: 2019-07-26 | End: 2019-07-26 | Stop reason: SURG

## 2019-07-26 RX ORDER — ONDANSETRON 2 MG/ML
4 INJECTION INTRAMUSCULAR; INTRAVENOUS AS NEEDED
Status: DISCONTINUED | OUTPATIENT
Start: 2019-07-26 | End: 2019-07-26 | Stop reason: HOSPADM

## 2019-07-26 RX ORDER — LIDOCAINE HYDROCHLORIDE 20 MG/ML
INJECTION, SOLUTION INFILTRATION; PERINEURAL AS NEEDED
Status: DISCONTINUED | OUTPATIENT
Start: 2019-07-26 | End: 2019-07-26 | Stop reason: SURG

## 2019-07-26 RX ADMIN — SODIUM CHLORIDE, POTASSIUM CHLORIDE, SODIUM LACTATE AND CALCIUM CHLORIDE: 600; 310; 30; 20 INJECTION, SOLUTION INTRAVENOUS at 08:15

## 2019-07-26 RX ADMIN — PROPOFOL 50 MG: 10 INJECTION, EMULSION INTRAVENOUS at 08:33

## 2019-07-26 RX ADMIN — FENTANYL CITRATE 100 MCG: 50 INJECTION INTRAMUSCULAR; INTRAVENOUS at 08:15

## 2019-07-26 RX ADMIN — EPHEDRINE SULFATE 5 MG: 50 INJECTION INTRAMUSCULAR; INTRAVENOUS; SUBCUTANEOUS at 08:29

## 2019-07-26 RX ADMIN — PROPOFOL 50 MG: 10 INJECTION, EMULSION INTRAVENOUS at 08:29

## 2019-07-26 RX ADMIN — PROPOFOL 50 MG: 10 INJECTION, EMULSION INTRAVENOUS at 08:25

## 2019-07-26 RX ADMIN — MIDAZOLAM HYDROCHLORIDE 2 MG: 1 INJECTION, SOLUTION INTRAMUSCULAR; INTRAVENOUS at 08:15

## 2019-07-26 RX ADMIN — PROPOFOL 50 MG: 10 INJECTION, EMULSION INTRAVENOUS at 08:21

## 2019-07-26 RX ADMIN — LIDOCAINE HYDROCHLORIDE 100 MG: 20 INJECTION, SOLUTION INFILTRATION; PERINEURAL at 08:15

## 2019-07-26 RX ADMIN — PROPOFOL 100 MG: 10 INJECTION, EMULSION INTRAVENOUS at 08:17

## 2019-07-26 NOTE — ANESTHESIA PREPROCEDURE EVALUATION
Anesthesia Evaluation     Patient summary reviewed and Nursing notes reviewed                Airway   Mallampati: II  TM distance: >3 FB  Neck ROM: full  no difficulty expected  Dental    (+) poor dentition    Pulmonary - negative pulmonary ROS   (+) decreased breath sounds,   Cardiovascular - negative cardio ROS    Rhythm: regular  Rate: normal        Neuro/Psych- negative ROS  GI/Hepatic/Renal/Endo - negative ROS     Musculoskeletal (-) negative ROS    Abdominal    Substance History - negative use     OB/GYN negative ob/gyn ROS         Other                        Anesthesia Plan    ASA 3     general     intravenous induction   Anesthetic plan, all risks, benefits, and alternatives have been provided, discussed and informed consent has been obtained with: patient.  Use of blood products discussed with patient .   Plan discussed with CRNA.

## 2019-07-26 NOTE — ANESTHESIA POSTPROCEDURE EVALUATION
Patient: Clay Mark    Procedure Summary     Date:  07/26/19 Room / Location:  [facility] OR  /  [facility] OR    Anesthesia Start:  0815 Anesthesia Stop:  0838    Procedures:       ESOPHAGOGASTRODUODENOSCOPY (N/A Esophagus)      COLONOSCOPY (N/A ) Diagnosis:       Diarrhea, unspecified type      Non-recurrent bilateral inguinal hernia without obstruction or gangrene      (Diarrhea, unspecified type [R19.7])      (Non-recurrent bilateral inguinal hernia without obstruction or gangrene [K40.20])    Surgeon:  Akash Oro MD Provider:  Ezra Cheng MD    Anesthesia Type:  general ASA Status:  3          Anesthesia Type: general  Last vitals  BP   126/78 (07/26/19 0744)   Temp   97.8 °F (36.6 °C) (07/26/19 0744)   Pulse   62 (07/26/19 0744)   Resp   18 (07/26/19 0744)     SpO2   97 % (07/26/19 0744)     Post Anesthesia Care and Evaluation    Patient location during evaluation: PHASE II  Patient participation: complete - patient participated  Level of consciousness: awake and alert  Pain score: 1  Pain management: adequate  Airway patency: patent  Anesthetic complications: No anesthetic complications  PONV Status: controlled  Cardiovascular status: acceptable  Respiratory status: acceptable  Hydration status: acceptable

## 2019-07-30 LAB
LAB AP CASE REPORT: NORMAL
PATH REPORT.FINAL DX SPEC: NORMAL

## 2019-08-05 ENCOUNTER — TELEPHONE (OUTPATIENT)
Dept: FAMILY MEDICINE CLINIC | Facility: CLINIC | Age: 54
End: 2019-08-05

## 2019-08-05 NOTE — TELEPHONE ENCOUNTER
Tried to call patient to discuss recent MyChart messages. Left message on machine for patient to return call to clinic. AF

## 2019-08-05 NOTE — TELEPHONE ENCOUNTER
Spoke with patient and discussed [employer] message. Explained to the patient that Dr. Wisdom message saying Take Care was not intended to be disrespectful, only meant for it to end the messages. Explained that for any of the issues discussed in the [employer] messages that the patient would need an office visit. Explained that we do not write controlled medications for sleep aid, or pain management. Patient voiced understanding and scheduled an appt. Explained that I will be in the exam room with the patient to make sure the problems are addressed.     Patient voiced understanding

## 2019-08-15 ENCOUNTER — OFFICE VISIT (OUTPATIENT)
Dept: SURGERY | Facility: CLINIC | Age: 54
End: 2019-08-15

## 2019-08-15 VITALS
SYSTOLIC BLOOD PRESSURE: 110 MMHG | HEART RATE: 100 BPM | BODY MASS INDEX: 25.29 KG/M2 | RESPIRATION RATE: 16 BRPM | DIASTOLIC BLOOD PRESSURE: 75 MMHG | OXYGEN SATURATION: 100 % | TEMPERATURE: 97.5 F | WEIGHT: 151.8 LBS | HEIGHT: 65 IN

## 2019-08-15 DIAGNOSIS — K40.20 NON-RECURRENT BILATERAL INGUINAL HERNIA WITHOUT OBSTRUCTION OR GANGRENE: Primary | ICD-10-CM

## 2019-08-15 PROCEDURE — S0260 H&P FOR SURGERY: HCPCS | Performed by: SURGERY

## 2019-08-15 NOTE — H&P (VIEW-ONLY)
8/15/2019    Patient Information  Clay Mark  [address]  [place] [address]  1965  [phone] (home)     Chief Complaint   Patient presents with   • Follow-up     FU EGD/Colonoscopy       HPI  Patient is a 54-year-old white male who is referred by Dr. Wisdom.  He was originally referred because of right inguinal hernia.  He also had complaints of multiple GI problems.  I performed an EGD and colonoscopy all which were normal.  He is now ready to have his hernias fixed.  I found to have a very small left inguinal hernia on physical exam as well.    Review of Systems    The Past medical history, family history, social history, medication list, allergies, and Review of Systems has been reviewed and confirmed.      General: fever, chills  Integumentary: rash and non-healing wound  Eyes: eyesight problems, glasses  ENT: earache  Respiratory: negative  Gastrointestinal: nausea/vomiting, loss of appetite, diarrhea, constipation, change in stool and abdominal pain  Cardiovascular: negative  Neurological: numbness  Psychiatric: anxiety, depression, insomnia and mood swings  Hematologic/Lymphatic: easy bleeding and easy bruising  Genitourinary: incontinence, painful urination and frequent urination  Musculoskeletal: painful joints, sore muscles, back pain and joint stiffness  Endocrine: hot flashes  Breasts: negative    Physical Exam   Constitutional: He is oriented to person, place, and time. He appears well-developed and well-nourished. No distress.   HENT:   Head: Normocephalic.   Right Ear: External ear normal.   Left Ear: External ear normal.   Nose: Nose normal.   Mouth/Throat: Oropharynx is clear and moist.   Eyes: Conjunctivae and EOM are normal. Right eye exhibits no discharge. Left eye exhibits no discharge.   Neck: Normal range of motion. No JVD present. No tracheal deviation present. No thyromegaly present.   Cardiovascular: Normal rate, regular rhythm, normal heart sounds and intact distal  pulses. Exam reveals no gallop and no friction rub.   No murmur heard.  Pulmonary/Chest: Effort normal and breath sounds normal. No stridor. No respiratory distress. He has no wheezes. He has no rales. He exhibits no tenderness.   Abdominal: Soft. Bowel sounds are normal. He exhibits no distension and no mass. There is no tenderness. There is no rebound and no guarding. No hernia.   Genitourinary: Rectal exam shows guaiac negative stool.   Musculoskeletal: Normal range of motion. He exhibits no edema, tenderness or deformity.   Lymphadenopathy:     He has no cervical adenopathy.   Neurological: He is alert and oriented to person, place, and time. He has normal reflexes. He displays normal reflexes. No cranial nerve deficit. He exhibits normal muscle tone. Coordination normal.   Skin: Skin is warm and dry. No rash noted. He is not diaphoretic. No erythema. No pallor.   Psychiatric: He has a normal mood and affect. His behavior is normal. Judgment and thought content normal.     Obvious right inguinal hernia which is reducible  Very small left inguinal hernia on cough    Patient Active Problem List   Diagnosis   • MDD (major depressive disorder)   • Non-recurrent bilateral inguinal hernia without obstruction or gangrene         Past Medical History:   Diagnosis Date   • Alcohol abuse    • Anxiety    • Arthritis    • Bipolar disorder (CMS/HCC)    • Depression    • GERD (gastroesophageal reflux disease)    • History of transfusion    • Psychiatric illness    • Suicide attempt (CMS/HCC)          Past Surgical History:   Procedure Laterality Date   • APPENDECTOMY     • COLONOSCOPY N/A 7/26/2019    Procedure: COLONOSCOPY;  Surgeon: Akash Oro MD;  Location: [facility] OR;  Service: Gastroenterology   • ENDOSCOPY N/A 7/26/2019    Procedure: ESOPHAGOGASTRODUODENOSCOPY;  Surgeon: Akash Oro MD;  Location: [facility] OR;  Service: Gastroenterology   • KNEE SURGERY     • ORIF ELBOW FRACTURE      Right elbow   •  "TONSILLECTOMY           Family History   Problem Relation Age of Onset   • No Known Problems Mother    • No Known Problems Father          Social History     Tobacco Use   • Smoking status: Current Every Day Smoker     Packs/day: 1.00     Years: 30.00     Pack years: 30.00     Types: Cigarettes   • Smokeless tobacco: Never Used   Substance Use Topics   • Alcohol use: Yes     Alcohol/week: 7.2 oz     Types: 12 Cans of beer per week     Comment: Last used 11/27/17   • Drug use: No       Current Outpatient Medications   Medication Sig Dispense Refill   • fluticasone (FLONASE) 50 MCG/ACT nasal spray 2 sprays into the nostril(s) as directed by provider Daily. 18.2 mL 3   • guaiFENesin (MUCINEX) 600 MG 12 hr tablet Take 1,200 mg by mouth 2 (Two) Times a Day.       No current facility-administered medications for this visit.          Allergies  Nsaids    /75   Pulse 100   Temp 97.5 °F (36.4 °C)   Resp 16   Ht 165.1 cm (65\")   Wt 68.9 kg (151 lb 12.8 oz)   SpO2 100%   BMI 25.26 kg/m²            ASSESSMENT  Bilateral inguinal hernia        PLAN    Laparoscopic repair bilateral inguinal hernia    Patient's Body mass index is 25.26 kg/m². BMI is above normal parameters. Recommendations include: educational material.           This document signed by Akash Oro MD August 15, 2019 3:33 PM   "

## 2019-08-15 NOTE — PROGRESS NOTES
8/15/2019    Patient Information  Clay Mark  [address]  [place] [address]  1965  [phone] (home)     Chief Complaint   Patient presents with   • Follow-up     FU EGD/Colonoscopy       HPI  Patient is a 54-year-old white male who is referred by Dr. Wisdom.  He was originally referred because of right inguinal hernia.  He also had complaints of multiple GI problems.  I performed an EGD and colonoscopy all which were normal.  He is now ready to have his hernias fixed.  I found to have a very small left inguinal hernia on physical exam as well.    Review of Systems    The Past medical history, family history, social history, medication list, allergies, and Review of Systems has been reviewed and confirmed.      General: fever, chills  Integumentary: rash and non-healing wound  Eyes: eyesight problems, glasses  ENT: earache  Respiratory: negative  Gastrointestinal: nausea/vomiting, loss of appetite, diarrhea, constipation, change in stool and abdominal pain  Cardiovascular: negative  Neurological: numbness  Psychiatric: anxiety, depression, insomnia and mood swings  Hematologic/Lymphatic: easy bleeding and easy bruising  Genitourinary: incontinence, painful urination and frequent urination  Musculoskeletal: painful joints, sore muscles, back pain and joint stiffness  Endocrine: hot flashes  Breasts: negative    Physical Exam   Constitutional: He is oriented to person, place, and time. He appears well-developed and well-nourished. No distress.   HENT:   Head: Normocephalic.   Right Ear: External ear normal.   Left Ear: External ear normal.   Nose: Nose normal.   Mouth/Throat: Oropharynx is clear and moist.   Eyes: Conjunctivae and EOM are normal. Right eye exhibits no discharge. Left eye exhibits no discharge.   Neck: Normal range of motion. No JVD present. No tracheal deviation present. No thyromegaly present.   Cardiovascular: Normal rate, regular rhythm, normal heart sounds and intact distal  pulses. Exam reveals no gallop and no friction rub.   No murmur heard.  Pulmonary/Chest: Effort normal and breath sounds normal. No stridor. No respiratory distress. He has no wheezes. He has no rales. He exhibits no tenderness.   Abdominal: Soft. Bowel sounds are normal. He exhibits no distension and no mass. There is no tenderness. There is no rebound and no guarding. No hernia.   Genitourinary: Rectal exam shows guaiac negative stool.   Musculoskeletal: Normal range of motion. He exhibits no edema, tenderness or deformity.   Lymphadenopathy:     He has no cervical adenopathy.   Neurological: He is alert and oriented to person, place, and time. He has normal reflexes. He displays normal reflexes. No cranial nerve deficit. He exhibits normal muscle tone. Coordination normal.   Skin: Skin is warm and dry. No rash noted. He is not diaphoretic. No erythema. No pallor.   Psychiatric: He has a normal mood and affect. His behavior is normal. Judgment and thought content normal.     Obvious right inguinal hernia which is reducible  Very small left inguinal hernia on cough    Patient Active Problem List   Diagnosis   • MDD (major depressive disorder)   • Non-recurrent bilateral inguinal hernia without obstruction or gangrene         Past Medical History:   Diagnosis Date   • Alcohol abuse    • Anxiety    • Arthritis    • Bipolar disorder (CMS/HCC)    • Depression    • GERD (gastroesophageal reflux disease)    • History of transfusion    • Psychiatric illness    • Suicide attempt (CMS/HCC)          Past Surgical History:   Procedure Laterality Date   • APPENDECTOMY     • COLONOSCOPY N/A 7/26/2019    Procedure: COLONOSCOPY;  Surgeon: Akash Oro MD;  Location: [facility] OR;  Service: Gastroenterology   • ENDOSCOPY N/A 7/26/2019    Procedure: ESOPHAGOGASTRODUODENOSCOPY;  Surgeon: Akash Oro MD;  Location: [facility] OR;  Service: Gastroenterology   • KNEE SURGERY     • ORIF ELBOW FRACTURE      Right elbow   •  "TONSILLECTOMY           Family History   Problem Relation Age of Onset   • No Known Problems Mother    • No Known Problems Father          Social History     Tobacco Use   • Smoking status: Current Every Day Smoker     Packs/day: 1.00     Years: 30.00     Pack years: 30.00     Types: Cigarettes   • Smokeless tobacco: Never Used   Substance Use Topics   • Alcohol use: Yes     Alcohol/week: 7.2 oz     Types: 12 Cans of beer per week     Comment: Last used 11/27/17   • Drug use: No       Current Outpatient Medications   Medication Sig Dispense Refill   • fluticasone (FLONASE) 50 MCG/ACT nasal spray 2 sprays into the nostril(s) as directed by provider Daily. 18.2 mL 3   • guaiFENesin (MUCINEX) 600 MG 12 hr tablet Take 1,200 mg by mouth 2 (Two) Times a Day.       No current facility-administered medications for this visit.          Allergies  Nsaids    /75   Pulse 100   Temp 97.5 °F (36.4 °C)   Resp 16   Ht 165.1 cm (65\")   Wt 68.9 kg (151 lb 12.8 oz)   SpO2 100%   BMI 25.26 kg/m²            ASSESSMENT  Bilateral inguinal hernia        PLAN    Laparoscopic repair bilateral inguinal hernia    Patient's Body mass index is 25.26 kg/m². BMI is above normal parameters. Recommendations include: educational material.           This document signed by Akash Oro MD August 15, 2019 3:33 PM   "

## 2019-08-16 ENCOUNTER — ANESTHESIA EVENT (OUTPATIENT)
Dept: PERIOP | Facility: HOSPITAL | Age: 54
End: 2019-08-16

## 2019-08-16 ENCOUNTER — ANESTHESIA (OUTPATIENT)
Dept: PERIOP | Facility: HOSPITAL | Age: 54
End: 2019-08-16

## 2019-08-16 ENCOUNTER — HOSPITAL ENCOUNTER (OUTPATIENT)
Facility: HOSPITAL | Age: 54
Setting detail: HOSPITAL OUTPATIENT SURGERY
Discharge: HOME | End: 2019-08-16
Attending: SURGERY | Admitting: SURGERY

## 2019-08-16 VITALS
TEMPERATURE: 97.6 F | HEIGHT: 65 IN | SYSTOLIC BLOOD PRESSURE: 132 MMHG | RESPIRATION RATE: 20 BRPM | BODY MASS INDEX: 25.16 KG/M2 | WEIGHT: 151 LBS | DIASTOLIC BLOOD PRESSURE: 79 MMHG | OXYGEN SATURATION: 98 % | HEART RATE: 72 BPM

## 2019-08-16 DIAGNOSIS — K40.20 NON-RECURRENT BILATERAL INGUINAL HERNIA WITHOUT OBSTRUCTION OR GANGRENE: ICD-10-CM

## 2019-08-16 PROCEDURE — 25010000002 DEXAMETHASONE PER 1 MG: Performed by: NURSE ANESTHETIST, CERTIFIED REGISTERED

## 2019-08-16 PROCEDURE — 25010000002 ONDANSETRON PER 1 MG: Performed by: NURSE ANESTHETIST, CERTIFIED REGISTERED

## 2019-08-16 PROCEDURE — C1781 MESH (IMPLANTABLE): HCPCS | Performed by: SURGERY

## 2019-08-16 PROCEDURE — 25010000002 PROPOFOL 10 MG/ML EMULSION: Performed by: NURSE ANESTHETIST, CERTIFIED REGISTERED

## 2019-08-16 PROCEDURE — 25010000002 NEOSTIGMINE 10 MG/10ML SOLUTION: Performed by: NURSE ANESTHETIST, CERTIFIED REGISTERED

## 2019-08-16 PROCEDURE — 25010000002 MIDAZOLAM PER 1 MG: Performed by: ANESTHESIOLOGY

## 2019-08-16 PROCEDURE — 25010000002 FENTANYL CITRATE (PF) 100 MCG/2ML SOLUTION: Performed by: NURSE ANESTHETIST, CERTIFIED REGISTERED

## 2019-08-16 PROCEDURE — 49650 LAP ING HERNIA REPAIR INIT: CPT | Performed by: SURGERY

## 2019-08-16 PROCEDURE — 25010000003 LIDOCAINE 1 % SOLUTION: Performed by: NURSE ANESTHETIST, CERTIFIED REGISTERED

## 2019-08-16 PROCEDURE — 25010000002 VANCOMYCIN 5 G RECONSTITUTED SOLUTION 5,000 MG VIAL: Performed by: SURGERY

## 2019-08-16 PROCEDURE — 25010000002 ROPIVACAINE PER 1 MG: Performed by: NURSE ANESTHETIST, CERTIFIED REGISTERED

## 2019-08-16 DEVICE — BARD 3DMAX MESH RIGHT MEDIUM
Type: IMPLANTABLE DEVICE | Site: GROIN | Status: FUNCTIONAL
Brand: BARD 3DMAX MESH

## 2019-08-16 DEVICE — BARD 3DMAX MESH LEFT MEDIUM
Type: IMPLANTABLE DEVICE | Site: GROIN | Status: FUNCTIONAL
Brand: BARD 3DMAX MESH

## 2019-08-16 RX ORDER — FENTANYL CITRATE 50 UG/ML
INJECTION, SOLUTION INTRAMUSCULAR; INTRAVENOUS AS NEEDED
Status: DISCONTINUED | OUTPATIENT
Start: 2019-08-16 | End: 2019-08-16 | Stop reason: SURG

## 2019-08-16 RX ORDER — IPRATROPIUM BROMIDE AND ALBUTEROL SULFATE 2.5; .5 MG/3ML; MG/3ML
3 SOLUTION RESPIRATORY (INHALATION) ONCE AS NEEDED
Status: DISCONTINUED | OUTPATIENT
Start: 2019-08-16 | End: 2019-08-16 | Stop reason: HOSPADM

## 2019-08-16 RX ORDER — ROCURONIUM BROMIDE 10 MG/ML
INJECTION, SOLUTION INTRAVENOUS AS NEEDED
Status: DISCONTINUED | OUTPATIENT
Start: 2019-08-16 | End: 2019-08-16 | Stop reason: SURG

## 2019-08-16 RX ORDER — ONDANSETRON 2 MG/ML
4 INJECTION INTRAMUSCULAR; INTRAVENOUS AS NEEDED
Status: DISCONTINUED | OUTPATIENT
Start: 2019-08-16 | End: 2019-08-16 | Stop reason: HOSPADM

## 2019-08-16 RX ORDER — GLYCOPYRROLATE 0.2 MG/ML
INJECTION INTRAMUSCULAR; INTRAVENOUS AS NEEDED
Status: DISCONTINUED | OUTPATIENT
Start: 2019-08-16 | End: 2019-08-16 | Stop reason: SURG

## 2019-08-16 RX ORDER — MIDAZOLAM HYDROCHLORIDE 1 MG/ML
1 INJECTION INTRAMUSCULAR; INTRAVENOUS
Status: COMPLETED | OUTPATIENT
Start: 2019-08-16 | End: 2019-08-16

## 2019-08-16 RX ORDER — ROPIVACAINE HYDROCHLORIDE 5 MG/ML
INJECTION, SOLUTION EPIDURAL; INFILTRATION; PERINEURAL AS NEEDED
Status: DISCONTINUED | OUTPATIENT
Start: 2019-08-16 | End: 2019-08-16 | Stop reason: SURG

## 2019-08-16 RX ORDER — SODIUM CHLORIDE 0.9 % (FLUSH) 0.9 %
3-10 SYRINGE (ML) INJECTION AS NEEDED
Status: DISCONTINUED | OUTPATIENT
Start: 2019-08-16 | End: 2019-08-16 | Stop reason: HOSPADM

## 2019-08-16 RX ORDER — PROPOFOL 10 MG/ML
VIAL (ML) INTRAVENOUS AS NEEDED
Status: DISCONTINUED | OUTPATIENT
Start: 2019-08-16 | End: 2019-08-16 | Stop reason: SURG

## 2019-08-16 RX ORDER — SODIUM CHLORIDE, SODIUM LACTATE, POTASSIUM CHLORIDE, CALCIUM CHLORIDE 600; 310; 30; 20 MG/100ML; MG/100ML; MG/100ML; MG/100ML
125 INJECTION, SOLUTION INTRAVENOUS CONTINUOUS
Status: DISCONTINUED | OUTPATIENT
Start: 2019-08-16 | End: 2019-08-16 | Stop reason: HOSPADM

## 2019-08-16 RX ORDER — METOPROLOL TARTRATE 5 MG/5ML
INJECTION INTRAVENOUS AS NEEDED
Status: DISCONTINUED | OUTPATIENT
Start: 2019-08-16 | End: 2019-08-16 | Stop reason: SURG

## 2019-08-16 RX ORDER — FAMOTIDINE 10 MG/ML
INJECTION, SOLUTION INTRAVENOUS AS NEEDED
Status: DISCONTINUED | OUTPATIENT
Start: 2019-08-16 | End: 2019-08-16 | Stop reason: SURG

## 2019-08-16 RX ORDER — SODIUM CHLORIDE 0.9 % (FLUSH) 0.9 %
3 SYRINGE (ML) INJECTION EVERY 12 HOURS SCHEDULED
Status: DISCONTINUED | OUTPATIENT
Start: 2019-08-16 | End: 2019-08-16 | Stop reason: HOSPADM

## 2019-08-16 RX ORDER — ONDANSETRON 2 MG/ML
INJECTION INTRAMUSCULAR; INTRAVENOUS AS NEEDED
Status: DISCONTINUED | OUTPATIENT
Start: 2019-08-16 | End: 2019-08-16 | Stop reason: SURG

## 2019-08-16 RX ORDER — MEPERIDINE HYDROCHLORIDE 25 MG/ML
12.5 INJECTION INTRAMUSCULAR; INTRAVENOUS; SUBCUTANEOUS
Status: COMPLETED | OUTPATIENT
Start: 2019-08-16 | End: 2019-08-16

## 2019-08-16 RX ORDER — LIDOCAINE HYDROCHLORIDE 10 MG/ML
INJECTION, SOLUTION INFILTRATION; PERINEURAL AS NEEDED
Status: DISCONTINUED | OUTPATIENT
Start: 2019-08-16 | End: 2019-08-16 | Stop reason: SURG

## 2019-08-16 RX ORDER — SODIUM CHLORIDE 9 MG/ML
INJECTION, SOLUTION INTRAVENOUS AS NEEDED
Status: DISCONTINUED | OUTPATIENT
Start: 2019-08-16 | End: 2019-08-16 | Stop reason: HOSPADM

## 2019-08-16 RX ORDER — DEXAMETHASONE SODIUM PHOSPHATE 4 MG/ML
INJECTION, SOLUTION INTRA-ARTICULAR; INTRALESIONAL; INTRAMUSCULAR; INTRAVENOUS; SOFT TISSUE AS NEEDED
Status: DISCONTINUED | OUTPATIENT
Start: 2019-08-16 | End: 2019-08-16 | Stop reason: SURG

## 2019-08-16 RX ORDER — MAGNESIUM HYDROXIDE 1200 MG/15ML
LIQUID ORAL AS NEEDED
Status: DISCONTINUED | OUTPATIENT
Start: 2019-08-16 | End: 2019-08-16 | Stop reason: HOSPADM

## 2019-08-16 RX ORDER — OXYCODONE HYDROCHLORIDE AND ACETAMINOPHEN 5; 325 MG/1; MG/1
1-2 TABLET ORAL EVERY 4 HOURS PRN
Qty: 15 TABLET | Refills: 0 | Status: SHIPPED | OUTPATIENT
Start: 2019-08-16 | End: 2020-03-13

## 2019-08-16 RX ORDER — OXYCODONE HYDROCHLORIDE AND ACETAMINOPHEN 5; 325 MG/1; MG/1
1 TABLET ORAL ONCE AS NEEDED
Status: DISCONTINUED | OUTPATIENT
Start: 2019-08-16 | End: 2019-08-16 | Stop reason: HOSPADM

## 2019-08-16 RX ORDER — FENTANYL CITRATE 50 UG/ML
50 INJECTION, SOLUTION INTRAMUSCULAR; INTRAVENOUS
Status: DISCONTINUED | OUTPATIENT
Start: 2019-08-16 | End: 2019-08-16 | Stop reason: HOSPADM

## 2019-08-16 RX ORDER — MIDAZOLAM HYDROCHLORIDE 1 MG/ML
2 INJECTION INTRAMUSCULAR; INTRAVENOUS
Status: COMPLETED | OUTPATIENT
Start: 2019-08-16 | End: 2019-08-16

## 2019-08-16 RX ORDER — NEOSTIGMINE METHYLSULFATE 1 MG/ML
INJECTION, SOLUTION INTRAVENOUS AS NEEDED
Status: DISCONTINUED | OUTPATIENT
Start: 2019-08-16 | End: 2019-08-16 | Stop reason: SURG

## 2019-08-16 RX ADMIN — FENTANYL CITRATE 50 MCG: 50 INJECTION INTRAMUSCULAR; INTRAVENOUS at 11:57

## 2019-08-16 RX ADMIN — MEPERIDINE HYDROCHLORIDE 12.5 MG: 25 INJECTION INTRAMUSCULAR; INTRAVENOUS; SUBCUTANEOUS at 12:50

## 2019-08-16 RX ADMIN — PROPOFOL 140 MG: 10 INJECTION, EMULSION INTRAVENOUS at 11:08

## 2019-08-16 RX ADMIN — FENTANYL CITRATE 50 MCG: 50 INJECTION INTRAMUSCULAR; INTRAVENOUS at 11:13

## 2019-08-16 RX ADMIN — FENTANYL CITRATE 50 MCG: 50 INJECTION INTRAMUSCULAR; INTRAVENOUS at 11:08

## 2019-08-16 RX ADMIN — MIDAZOLAM HYDROCHLORIDE 2 MG: 1 INJECTION, SOLUTION INTRAMUSCULAR; INTRAVENOUS at 09:44

## 2019-08-16 RX ADMIN — DEXAMETHASONE SODIUM PHOSPHATE 8 MG: 4 INJECTION, SOLUTION INTRAMUSCULAR; INTRAVENOUS at 11:17

## 2019-08-16 RX ADMIN — MIDAZOLAM HYDROCHLORIDE 2 MG: 1 INJECTION, SOLUTION INTRAMUSCULAR; INTRAVENOUS at 11:06

## 2019-08-16 RX ADMIN — ROCURONIUM BROMIDE 40 MG: 10 INJECTION INTRAVENOUS at 11:08

## 2019-08-16 RX ADMIN — ROPIVACAINE HYDROCHLORIDE 40 ML: 5 INJECTION, SOLUTION EPIDURAL; INFILTRATION; PERINEURAL at 11:17

## 2019-08-16 RX ADMIN — GLYCOPYRROLATE 0.4 MG: 0.2 INJECTION, SOLUTION INTRAMUSCULAR; INTRAVENOUS at 12:07

## 2019-08-16 RX ADMIN — VANCOMYCIN HYDROCHLORIDE 1 G: 5 INJECTION, POWDER, LYOPHILIZED, FOR SOLUTION INTRAVENOUS at 11:06

## 2019-08-16 RX ADMIN — FAMOTIDINE 20 MG: 10 INJECTION, SOLUTION INTRAVENOUS at 11:06

## 2019-08-16 RX ADMIN — MEPERIDINE HYDROCHLORIDE 12.5 MG: 25 INJECTION INTRAMUSCULAR; INTRAVENOUS; SUBCUTANEOUS at 12:45

## 2019-08-16 RX ADMIN — FENTANYL CITRATE 50 MCG: 50 INJECTION INTRAMUSCULAR; INTRAVENOUS at 12:25

## 2019-08-16 RX ADMIN — LIDOCAINE HYDROCHLORIDE 60 MG: 10 INJECTION, SOLUTION INFILTRATION; PERINEURAL at 11:08

## 2019-08-16 RX ADMIN — FENTANYL CITRATE 50 MCG: 50 INJECTION INTRAMUSCULAR; INTRAVENOUS at 12:30

## 2019-08-16 RX ADMIN — SODIUM CHLORIDE, POTASSIUM CHLORIDE, SODIUM LACTATE AND CALCIUM CHLORIDE 125 ML/HR: 600; 310; 30; 20 INJECTION, SOLUTION INTRAVENOUS at 09:38

## 2019-08-16 RX ADMIN — METOPROLOL TARTRATE 2 MG: 1 INJECTION, SOLUTION INTRAVENOUS at 11:40

## 2019-08-16 RX ADMIN — FENTANYL CITRATE 50 MCG: 50 INJECTION INTRAMUSCULAR; INTRAVENOUS at 11:36

## 2019-08-16 RX ADMIN — ONDANSETRON 4 MG: 2 INJECTION, SOLUTION INTRAMUSCULAR; INTRAVENOUS at 12:06

## 2019-08-16 RX ADMIN — NEOSTIGMINE METHYLSULFATE 3 MG: 1 INJECTION, SOLUTION INTRAVENOUS at 12:07

## 2019-08-16 NOTE — ADDENDUM NOTE
Addendum  created 08/16/19 1306 by Jefferson Velázquez CRNA    Child order released for a procedure order, Intraprocedure Blocks edited, Intraprocedure Meds edited, Sign clinical note

## 2019-08-16 NOTE — ANESTHESIA PROCEDURE NOTES
"Peripheral Block      Patient location during procedure: OR  Start time: 8/16/2019 11:12 AM  Stop time: 8/16/2019 11:17 AM  Reason for block: at surgeon's request and post-op pain management  Performed by  CRNA: Jefferson Velázquez CRNA  Preanesthetic Checklist  Completed: patient identified, site marked, surgical consent, pre-op evaluation, timeout performed, IV checked, risks and benefits discussed and monitors and equipment checked  Prep:  Pt Position: supine  Sterile barriers:cap, gloves, sterile barriers and mask  Prep: ChloraPrep  Patient monitoring: blood pressure monitoring, continuous pulse oximetry and EKG  Procedure  Nursing cardiac assessment comments yes: Sedation, GA, Spinal,Epidural   Performed under: general  Guidance:ultrasound guided  ULTRASOUND INTERPRETATION. Using ultrasound guidance a 20 G (20g 4\" Stimuplex) gauge needle was placed in close proximity to the nerve, at which point, under ultrasound guidance anesthetic was injected in the area of the nerve and spread of the anesthesia was seen on ultrasound in close proximity thereto.  There were no abnormalities seen on ultrasound; a digital image was taken; and the patient tolerated the procedure with no complications. Images:still images obtained    Laterality:Bilateral  Block Type:TAP  Injection Technique:single-shot  Needle Type:short-bevel  Needle Gauge:20 G            Medications  Comment:Block Injection:  40 ml 0.5% Ropivicaine with 10ml NS - LA/saline dose divided equally between Right and Left block       Adjuncts:  Decadron 8mg PSF    Post Assessment  Injection Assessment: negative aspiration for heme, incremental injection and no paresthesia on injection  Patient Tolerance:comfortable throughout block  Complications:no  Additional Notes  The pt was in the supine position under general anesthesia.    Under Ultrasound guidance, a BBraun 4inch 360 degree needle was advanced with Normal Saline hydro dissection of tissue.  The " Internal Oblique and Transversus Abdominus muscles where visualized.  At or before the aponeurosis of Internal Oblique, local anesthetic spread was visualized in the Transversus Abdominus Plane. Injection was made incrementally with aspiration every 5 mls.  There was no  intravascular injection,  injection pressure was normal, there was no neural injection, and the procedure was completed without difficulty. The same procedure was completed for left and right sided subcostal tap blocks. Thank You.

## 2019-08-16 NOTE — OP NOTE
Clay Mark  8/16/2019      Operative Progress Note:    Surgeon and Assistant: Dr. Oro    Pre-Operative Diagnosis: Bilateral inguinal hernia    Post-Operative Diagnosis: Bilateral inguinal hernias    Procedure(s): Laparoscopic repair bilateral inguinal hernia    Type of Anesthesia Administered: General endotracheal with tap block    Estimated Blood Loss: Minimal    Blood Products: None    Specimen Obtained/Removed:  None    Complication(s):  None    Graft/Implant/Prosthetics/Implanted Device/Transplants: Bilateral Bard 3D max mesh    Indication: Patient is a 54-year-old white male    Findings: Bilateral direct inguinal hernias    Operative Report:  Patient was taken to the operating room and laid in a supine position on the operating table.  General endotracheal anesthesia was induced and the lower abdomen, groin and genital area prepped and draped in usual sterile fashion.  A subumbilical incision was made in the pre-peritoneal space was entered.  A dissector was placed in the routine fashion and insufflated under direct vision.  The dissector was removed and a 12 mm trocar was placed in the preperitoneal space and insufflated with CO2 to a maximum pressure of 15 mm of mercury.  Right lower quadrant and left lower quadrant 5 mm trochars were placed under direct vision after injecting 0.5% Marcaine with epinephrine.  Bilateral inguinal areas were dissected exposing Nate's ligament, transversalis abdominous, epigastric vessels, and femoral artery and vein.  A polypropylene mesh was placed to cover the direct and indirect space.  No tacks or sutures were used.  All trochars were removed under direct vision.  The fascia at the umbilicus was closed with 0 Vicryl x2.  Skin incisions were closed with 4-0 Monocryl.  Sterile dressings were applied and the procedure terminated.  Patient tolerated the procedure very well and was returned to the PACU in satisfactory condition.    Discharge Summary:    Patient will be  discharged home after recovery.  Patient will be seen back in the office in 7 days.  Percocet 5/325 one to 2 tablets every 4 hours when necessary for pain.  Patient is to call the office or hospital for any difficulties.      Electronically Signed by: Akash Oro MD        Dictated Utilizing Dragon Dictation

## 2019-08-16 NOTE — ANESTHESIA POSTPROCEDURE EVALUATION
Patient: Clay Mark    Procedure Summary     Date:  08/16/19 Room / Location:  [facility] OR 02 /  [facility] OR    Anesthesia Start:  1106 Anesthesia Stop:  1217    Procedure:  INGUINAL HERNIA REPAIR LAPAROSCOPIC BILATERAL (Bilateral Abdomen) Diagnosis:       Non-recurrent bilateral inguinal hernia without obstruction or gangrene      (Non-recurrent bilateral inguinal hernia without obstruction or gangrene [K40.20])    Surgeon:  Akash Oro MD Provider:  Daryl Lawson MD    Anesthesia Type:  general with block ASA Status:  3          Anesthesia Type: general with block  Last vitals  BP   127/81 (08/16/19 1258)   Temp   97.6 °F (36.4 °C) (08/16/19 1258)   Pulse   65 (08/16/19 1258)   Resp   20 (08/16/19 1258)     SpO2   97 % (08/16/19 1258)     Post Anesthesia Care and Evaluation    Patient location during evaluation: PHASE II  Patient participation: complete - patient participated  Level of consciousness: awake and alert  Pain score: 1  Pain management: adequate  Airway patency: patent  Anesthetic complications: No anesthetic complications  PONV Status: controlled  Cardiovascular status: acceptable  Respiratory status: acceptable  Hydration status: acceptable

## 2019-08-16 NOTE — ANESTHESIA PROCEDURE NOTES
Airway  Urgency: elective    Airway not difficult    General Information and Staff    Patient location during procedure: OR    Indications and Patient Condition  Indications for airway management: airway protection    Preoxygenated: yes  Mask difficulty assessment: 1 - vent by mask    Final Airway Details  Final airway type: endotracheal airway      Successful airway: ETT  Cuffed: yes   Successful intubation technique: direct laryngoscopy  Facilitating devices/methods: intubating stylet and cricoid pressure  Endotracheal tube insertion site: oral  Blade: Moy  Blade size: 3  ETT size (mm): 7.5  Cormack-Lehane Classification: grade IIa - partial view of glottis  Placement verified by: chest auscultation and capnometry   Measured from: lips  ETT to lips (cm): 21  Number of attempts at approach: 1    Additional Comments  Dentition as preop

## 2019-08-16 NOTE — ANESTHESIA PREPROCEDURE EVALUATION
Anesthesia Evaluation     Patient summary reviewed and Nursing notes reviewed   no history of anesthetic complications:  NPO Solid Status: > 8 hours  NPO Liquid Status: > 8 hours           Airway   Mallampati: II  TM distance: >3 FB  Neck ROM: full  no difficulty expected  Dental    (+) poor dentition    Pulmonary - negative pulmonary ROS   (+) decreased breath sounds,   Cardiovascular - negative cardio ROS    Rhythm: regular  Rate: normal        Neuro/Psych  (+) psychiatric history Bipolar,     GI/Hepatic/Renal/Endo    (+)  GERD,      Musculoskeletal (-) negative ROS    Abdominal    Substance History   (+) alcohol use,      OB/GYN negative ob/gyn ROS         Other                          Anesthesia Plan    ASA 3     general with block   (TAP Block)  intravenous induction   Anesthetic plan, all risks, benefits, and alternatives have been provided, discussed and informed consent has been obtained with: patient.  Use of blood products discussed with patient .   Plan discussed with CRNA.

## 2020-03-11 ENCOUNTER — OFFICE VISIT (OUTPATIENT)
Dept: FAMILY MEDICINE CLINIC | Facility: CLINIC | Age: 55
End: 2020-03-11

## 2020-03-11 VITALS
DIASTOLIC BLOOD PRESSURE: 78 MMHG | HEART RATE: 74 BPM | OXYGEN SATURATION: 99 % | HEIGHT: 65 IN | SYSTOLIC BLOOD PRESSURE: 120 MMHG | TEMPERATURE: 98.3 F | BODY MASS INDEX: 25.76 KG/M2 | WEIGHT: 154.6 LBS

## 2020-03-11 DIAGNOSIS — E87.1 HYPONATREMIA: Primary | ICD-10-CM

## 2020-03-11 DIAGNOSIS — Z00.00 ENCOUNTER FOR ANNUAL PHYSICAL EXAM: ICD-10-CM

## 2020-03-11 PROCEDURE — 80053 COMPREHEN METABOLIC PANEL: CPT | Performed by: FAMILY MEDICINE

## 2020-03-11 PROCEDURE — 99396 PREV VISIT EST AGE 40-64: CPT | Performed by: FAMILY MEDICINE

## 2020-03-11 PROCEDURE — 85025 COMPLETE CBC W/AUTO DIFF WBC: CPT | Performed by: FAMILY MEDICINE

## 2020-03-11 PROCEDURE — 81003 URINALYSIS AUTO W/O SCOPE: CPT | Performed by: FAMILY MEDICINE

## 2020-03-12 LAB
ALBUMIN SERPL-MCNC: 4.3 G/DL (ref 3.5–5.2)
ALBUMIN/GLOB SERPL: 1.8 G/DL
ALP SERPL-CCNC: 76 U/L (ref 39–117)
ALT SERPL W P-5'-P-CCNC: 9 U/L (ref 1–41)
ANION GAP SERPL CALCULATED.3IONS-SCNC: 11.5 MMOL/L (ref 5–15)
AST SERPL-CCNC: 12 U/L (ref 1–40)
BASOPHILS # BLD AUTO: 0.05 10*3/MM3 (ref 0–0.2)
BASOPHILS NFR BLD AUTO: 0.7 % (ref 0–1.5)
BILIRUB SERPL-MCNC: 0.3 MG/DL (ref 0.2–1.2)
BILIRUB UR QL STRIP: NEGATIVE
BUN BLD-MCNC: 8 MG/DL (ref 6–20)
BUN/CREAT SERPL: 9.5 (ref 7–25)
CALCIUM SPEC-SCNC: 9.8 MG/DL (ref 8.6–10.5)
CHLORIDE SERPL-SCNC: 101 MMOL/L (ref 98–107)
CLARITY UR: CLEAR
CO2 SERPL-SCNC: 26.5 MMOL/L (ref 22–29)
COLOR UR: YELLOW
CREAT BLD-MCNC: 0.84 MG/DL (ref 0.76–1.27)
DEPRECATED RDW RBC AUTO: 46.7 FL (ref 37–54)
EOSINOPHIL # BLD AUTO: 0.35 10*3/MM3 (ref 0–0.4)
EOSINOPHIL NFR BLD AUTO: 5.1 % (ref 0.3–6.2)
ERYTHROCYTE [DISTWIDTH] IN BLOOD BY AUTOMATED COUNT: 12.5 % (ref 12.3–15.4)
GFR SERPL CREATININE-BSD FRML MDRD: 96 ML/MIN/1.73
GLOBULIN UR ELPH-MCNC: 2.4 GM/DL
GLUCOSE BLD-MCNC: 87 MG/DL (ref 65–99)
GLUCOSE UR STRIP-MCNC: NEGATIVE MG/DL
HCT VFR BLD AUTO: 43.8 % (ref 37.5–51)
HGB BLD-MCNC: 15.2 G/DL (ref 13–17.7)
HGB UR QL STRIP.AUTO: NEGATIVE
IMM GRANULOCYTES # BLD AUTO: 0.02 10*3/MM3 (ref 0–0.05)
IMM GRANULOCYTES NFR BLD AUTO: 0.3 % (ref 0–0.5)
KETONES UR QL STRIP: NEGATIVE
LEUKOCYTE ESTERASE UR QL STRIP.AUTO: NEGATIVE
LYMPHOCYTES # BLD AUTO: 2.93 10*3/MM3 (ref 0.7–3.1)
LYMPHOCYTES NFR BLD AUTO: 42.5 % (ref 19.6–45.3)
MCH RBC QN AUTO: 34.5 PG (ref 26.6–33)
MCHC RBC AUTO-ENTMCNC: 34.7 G/DL (ref 31.5–35.7)
MCV RBC AUTO: 99.5 FL (ref 79–97)
MONOCYTES # BLD AUTO: 0.7 10*3/MM3 (ref 0.1–0.9)
MONOCYTES NFR BLD AUTO: 10.2 % (ref 5–12)
NEUTROPHILS # BLD AUTO: 2.84 10*3/MM3 (ref 1.7–7)
NEUTROPHILS NFR BLD AUTO: 41.2 % (ref 42.7–76)
NITRITE UR QL STRIP: NEGATIVE
NRBC BLD AUTO-RTO: 0.1 /100 WBC (ref 0–0.2)
PH UR STRIP.AUTO: 7.5 [PH] (ref 5–8)
PLATELET # BLD AUTO: 236 10*3/MM3 (ref 140–450)
PMV BLD AUTO: 9.4 FL (ref 6–12)
POTASSIUM BLD-SCNC: 4.6 MMOL/L (ref 3.5–5.2)
PROT SERPL-MCNC: 6.7 G/DL (ref 6–8.5)
PROT UR QL STRIP: NEGATIVE
RBC # BLD AUTO: 4.4 10*6/MM3 (ref 4.14–5.8)
SODIUM BLD-SCNC: 139 MMOL/L (ref 136–145)
SP GR UR STRIP: 1.01 (ref 1–1.03)
UROBILINOGEN UR QL STRIP: NORMAL
WBC NRBC COR # BLD: 6.89 10*3/MM3 (ref 3.4–10.8)

## 2020-03-13 ENCOUNTER — TELEPHONE (OUTPATIENT)
Dept: FAMILY MEDICINE CLINIC | Facility: CLINIC | Age: 55
End: 2020-03-13

## 2020-03-13 NOTE — PROGRESS NOTES
"Subjective   Clay Mark is a 54 y.o. male and is here for a comprehensive physical exam. The patient reports no problems.    Do you take any herbs or supplements that were not prescribed by a doctor? no  Are you taking calcium supplements? no  Are you taking aspirin daily? no     History:  He will be due for updated PSA in the summer.  It was slightly elevated at 6 last year, though he had prostatitis.    The following portions of the patient's history were reviewed and updated as appropriate: allergies, current medications, past family history, past medical history, past social history, past surgical history and problem list.    Review of Systems  Do you have pain that bothers you in your daily life? no  Constitutional: negative  Eyes: negative  Ears, nose, mouth, throat, and face: negative  Respiratory: negative  Cardiovascular: negative  Gastrointestinal: negative  Genitourinary:negative  Hematologic/lymphatic: negative  Musculoskeletal:negative  Neurological: negative  Behavioral/Psych: negative  Endocrine: negative  Allergic/Immunologic: negative    Objective   /78 (BP Location: Right arm, Patient Position: Sitting)   Pulse 74   Temp 98.3 °F (36.8 °C) (Oral)   Ht 165.1 cm (65\")   Wt 70.1 kg (154 lb 9.6 oz)   SpO2 99%   BMI 25.73 kg/m²     General Appearance:    Alert, cooperative, no distress, appears stated age   Head:    Normocephalic, without obvious abnormality, atraumatic   Eyes:    PERRL, conjunctiva/corneas clear, EOM's intact, fundi     benign, both eyes        Ears:    Normal TM's and external ear canals, both ears   Nose:   Nares normal, septum midline, mucosa normal, no drainage    or sinus tenderness   Throat:   Lips, mucosa, and tongue normal; teeth and gums normal   Neck:   Supple, symmetrical, trachea midline, no adenopathy;        thyroid:  No enlargement/tenderness/nodules; no carotid    bruit or JVD   Back:     Symmetric, no curvature, ROM normal, no CVA tenderness   Lungs:    "  Clear to auscultation bilaterally, respirations unlabored   Chest wall:    No tenderness or deformity   Heart:    Regular rate and rhythm, S1 and S2 normal, no murmur, rub   or gallop   Abdomen:     Soft, non-tender, bowel sounds active all four quadrants,     no masses, no organomegaly           Extremities:   Extremities normal, atraumatic, no cyanosis or edema   Pulses:   2+ and symmetric all extremities   Skin:   Skin color, texture, turgor normal, no rashes or lesions   Lymph nodes:   Cervical, supraclavicular, and axillary nodes normal   Neurologic:   CNII-XII intact. Normal strength, sensation and reflexes       throughout        Assessment/Plan   Healthy male exam.     1.  Normal annual exam.  2. Patient Counseling:  --Nutrition: Stressed importance of moderation in sodium/caffeine intake, saturated fat and cholesterol, caloric balance, sufficient intake of fresh fruits, vegetables, fiber, calcium, iron, and 1 mg of folate supplement per day (for females capable of pregnancy).  --Discussed the issue of estrogen replacement, calcium supplement, and the daily use of baby aspirin.  --Exercise: Stressed the importance of regular exercise.   --Substance Abuse: Discussed cessation/primary prevention of tobacco, alcohol, or other drug use; driving or other dangerous activities under the influence; availability of treatment for abuse.    --Sexuality: Discussed sexually transmitted diseases, partner selection, use of condoms, avoidance of unintended pregnancy  and contraceptive alternatives.   --Injury prevention: Discussed safety belts, safety helmets, smoke detector, smoking near bedding or upholstery.   --Dental health: Discussed importance of regular tooth brushing, flossing, and dental visits.  --Immunizations reviewed.  --Discussed benefits of screening colonoscopy.  --After hours service discussed with patient    3. Discussed the patient's BMI with him.  The BMI is in the acceptable range  4. Follow up as  needed for acute illness  #5 elevated PSA.  He will be due for repeat PSA testing later this year.  His PSA was 6 last year.  He had prostatitis at that time.  He denies urinary symptoms at this time.  #6  He presented for physical exam for a local psychiatric clinic.  They needed him medically cleared.  They need a CBC, CMP, and a urinalysis.  All of these ordered today.  He is to follow-up in 3 months for PSA.

## 2020-03-13 NOTE — TELEPHONE ENCOUNTER
----- Message from Edgar Wisdom DO sent at 3/12/2020  7:20 PM EDT -----  Labs were normal.  No concerns.    Called patient. Number is not accepting calls at this time. Will send stable lab letter.

## 2021-03-16 ENCOUNTER — BULK ORDERING (OUTPATIENT)
Dept: CASE MANAGEMENT | Facility: OTHER | Age: 56
End: 2021-03-16

## 2021-03-16 DIAGNOSIS — Z23 IMMUNIZATION DUE: ICD-10-CM

## (undated) DEVICE — DRAPE,UTILTY,TAPE,15X26, 4EA/PK: Brand: MEDLINE

## (undated) DEVICE — TROCAR: Brand: KII SLEEVE

## (undated) DEVICE — SYR LUERLOK 30CC

## (undated) DEVICE — PK LAP GEN 70

## (undated) DEVICE — FRCP BX RADJAW4 NDL 2.8 240CM LG OG BX40

## (undated) DEVICE — SINGLE PORT MANIFOLD: Brand: NEPTUNE 2

## (undated) DEVICE — THE BITE BLOCK MAXI, LATEX FREE STRAP IS USED TO PROTECT THE ENDOSCOPE INSERTION TUBE FROM BEING BITTEN BY THE PATIENT.

## (undated) DEVICE — ENDOGATOR AUXILIARY WATER JET CONNECTOR: Brand: ENDOGATOR

## (undated) DEVICE — TROCAR: Brand: KII FIOS FIRST ENTRY

## (undated) DEVICE — TRY SKINPREP PVP SCRB W PAINT

## (undated) DEVICE — GOWN,REINF,POLY,ECL,PP SLV,XL: Brand: MEDLINE

## (undated) DEVICE — SKIN AFFIX SURG ADHESIVE 72/CS 0.55ML: Brand: MEDLINE

## (undated) DEVICE — HOLDER: Brand: DEROYAL

## (undated) DEVICE — TUBING, SUCTION, 1/4" X 20', STRAIGHT: Brand: MEDLINE INDUSTRIES, INC.

## (undated) DEVICE — SUT MNCRYL 4/0 PS2 18 IN

## (undated) DEVICE — ENDOCUT SCISSOR TIP, DISPOSABLE: Brand: RENEW

## (undated) DEVICE — PAD GRND REM POLYHESIVE A/ DISP

## (undated) DEVICE — CONN Y IRR DISP 1P/U

## (undated) DEVICE — GLV SURG PREMIERPRO MIC LTX PF SZ7.5 BRN

## (undated) DEVICE — [HIGH FLOW INSUFFLATOR,  DO NOT USE IF PACKAGE IS DAMAGED,  KEEP DRY,  KEEP AWAY FROM SUNLIGHT,  PROTECT FROM HEAT AND RADIOACTIVE SOURCES.]: Brand: PNEUMOSURE

## (undated) DEVICE — SUT VIC 0/0 UR6 27IN DYED J603H

## (undated) DEVICE — Device

## (undated) DEVICE — Device: Brand: DEFENDO AIR/WATER/SUCTION AND BIOPSY VALVE

## (undated) DEVICE — SUCTION CANISTER, 1500CC, RIGID: Brand: DEROYAL

## (undated) DEVICE — ENDOCUFF VISION GRN LG 11.2

## (undated) DEVICE — ACCESS AND DISSECTOR SYSTEM: Brand: SPACEMAKER